# Patient Record
Sex: FEMALE | Race: WHITE
[De-identification: names, ages, dates, MRNs, and addresses within clinical notes are randomized per-mention and may not be internally consistent; named-entity substitution may affect disease eponyms.]

---

## 2020-08-27 ENCOUNTER — HOSPITAL ENCOUNTER (OUTPATIENT)
Dept: HOSPITAL 97 - ER | Age: 75
Setting detail: OBSERVATION
LOS: 1 days | Discharge: HOME | End: 2020-08-28
Payer: COMMERCIAL

## 2020-08-27 VITALS — BODY MASS INDEX: 20.5 KG/M2

## 2020-08-27 DIAGNOSIS — R11.2: ICD-10-CM

## 2020-08-27 DIAGNOSIS — I65.22: ICD-10-CM

## 2020-08-27 DIAGNOSIS — Z20.828: ICD-10-CM

## 2020-08-27 DIAGNOSIS — R00.1: ICD-10-CM

## 2020-08-27 DIAGNOSIS — E86.0: ICD-10-CM

## 2020-08-27 DIAGNOSIS — I67.2: ICD-10-CM

## 2020-08-27 DIAGNOSIS — H81.10: Primary | ICD-10-CM

## 2020-08-27 DIAGNOSIS — Q28.3: ICD-10-CM

## 2020-08-27 LAB
ALBUMIN SERPL BCP-MCNC: 3.9 G/DL (ref 3.4–5)
ALP SERPL-CCNC: 79 U/L (ref 45–117)
ALT SERPL W P-5'-P-CCNC: 19 U/L (ref 12–78)
AST SERPL W P-5'-P-CCNC: 16 U/L (ref 15–37)
BUN BLD-MCNC: 12 MG/DL (ref 7–18)
GLUCOSE SERPLBLD-MCNC: 115 MG/DL (ref 74–106)
HCT VFR BLD CALC: 47.2 % (ref 36–45)
LIPASE SERPL-CCNC: 127 U/L (ref 73–393)
LYMPHOCYTES # SPEC AUTO: 1.8 K/UL (ref 0.7–4.9)
PMV BLD: 9.3 FL (ref 7.6–11.3)
POTASSIUM SERPL-SCNC: 4.3 MMOL/L (ref 3.5–5.1)
RBC # BLD: 5.11 M/UL (ref 3.86–4.86)
UA COMPLETE W REFLEX CULTURE PNL UR: (no result)

## 2020-08-27 PROCEDURE — 83690 ASSAY OF LIPASE: CPT

## 2020-08-27 PROCEDURE — 81015 MICROSCOPIC EXAM OF URINE: CPT

## 2020-08-27 PROCEDURE — 70450 CT HEAD/BRAIN W/O DYE: CPT

## 2020-08-27 PROCEDURE — 87086 URINE CULTURE/COLONY COUNT: CPT

## 2020-08-27 PROCEDURE — 81003 URINALYSIS AUTO W/O SCOPE: CPT

## 2020-08-27 PROCEDURE — 82947 ASSAY GLUCOSE BLOOD QUANT: CPT

## 2020-08-27 PROCEDURE — 85025 COMPLETE CBC W/AUTO DIFF WBC: CPT

## 2020-08-27 PROCEDURE — 70553 MRI BRAIN STEM W/O & W/DYE: CPT

## 2020-08-27 PROCEDURE — 82550 ASSAY OF CK (CPK): CPT

## 2020-08-27 PROCEDURE — 93880 EXTRACRANIAL BILAT STUDY: CPT

## 2020-08-27 PROCEDURE — 93005 ELECTROCARDIOGRAM TRACING: CPT

## 2020-08-27 PROCEDURE — 96374 THER/PROPH/DIAG INJ IV PUSH: CPT

## 2020-08-27 PROCEDURE — 70544 MR ANGIOGRAPHY HEAD W/O DYE: CPT

## 2020-08-27 PROCEDURE — 84484 ASSAY OF TROPONIN QUANT: CPT

## 2020-08-27 PROCEDURE — 87088 URINE BACTERIA CULTURE: CPT

## 2020-08-27 PROCEDURE — 96361 HYDRATE IV INFUSION ADD-ON: CPT

## 2020-08-27 PROCEDURE — 36415 COLL VENOUS BLD VENIPUNCTURE: CPT

## 2020-08-27 PROCEDURE — 80076 HEPATIC FUNCTION PANEL: CPT

## 2020-08-27 PROCEDURE — 99285 EMERGENCY DEPT VISIT HI MDM: CPT

## 2020-08-27 PROCEDURE — 70549 MR ANGIOGRAPH NECK W/O&W/DYE: CPT

## 2020-08-27 PROCEDURE — 96375 TX/PRO/DX INJ NEW DRUG ADDON: CPT

## 2020-08-27 PROCEDURE — 80061 LIPID PANEL: CPT

## 2020-08-27 PROCEDURE — 82553 CREATINE MB FRACTION: CPT

## 2020-08-27 PROCEDURE — 80048 BASIC METABOLIC PNL TOTAL CA: CPT

## 2020-08-27 NOTE — EDPHYS
Physician Documentation                                                                           

 [facility]                                                                 

Name: Ermelinda Malhotra                                                                               

Age: 75 yrs                                                                                       

Sex: Female                                                                                       

: 1945                                                                                   

MRN: [id number]                                                                                   

Arrival Date: 2020                                                                          

Time: 16:38                                                                                       

Account#: [id number]                                                                            

Bed 15                                                                                            

Private MD:                                                                                       

ED Physician Prasanna Liao                                                                         

HPI:                                                                                              

                                                                                             

17:52 This 75 yrs old  Female presents to ER via Ambulatory with complaints of       snw 

      Nausea, Dizziness.                                                                          

17:52 The patient presents to the emergency department with nausea, that is moderate, that is snw 

      severe. Onset: The symptoms/episode began/occurred suddenly, this morning. The symptoms     

      are aggravated by movement. Associated signs and symptoms: Pertinent positives: nausea.     

      Severity of symptoms: At their worst the symptoms were moderate severe this morning.        

      The patient has not experienced similar symptoms in the past. It is unknown whether or      

      not the patient has recently seen a physician.                                              

                                                                                                  

Historical:                                                                                       

- Allergies:                                                                                      

16:47 Morphine;                                                                               sv  

- PMHx:                                                                                           

16:47 None;                                                                                   sv  

- PSHx:                                                                                           

16:47 Hysterectomy; Hemorroidectomy; Appendectomy;                                            sv  

                                                                                                  

- Immunization history:: Adult Immunizations up to date.                                          

- Social history:: Smoking status: .                                                              

                                                                                                  

                                                                                                  

ROS:                                                                                              

17:49 Constitutional: Negative for fever, chills, and weight loss, Eyes: Negative for injury, snw 

      pain, redness, and discharge, ENT: Negative for injury, pain, and discharge, Neck:          

      Negative for injury, pain, and swelling, Cardiovascular: Negative for chest pain,           

      palpitations, and edema, Respiratory: Negative for shortness of breath, cough,              

      wheezing, and pleuritic chest pain, Abdomen/GI: Negative for abdominal pain, vomiting,      

      diarrhea, and constipation, and positive for nausea Back: Negative for injury and pain,     

      : Negative for injury, bleeding, discharge, and swelling, MS/Extremity: Negative for      

      injury and deformity, Skin: Negative for injury, rash, and discoloration.                   

17:49 Neuro: Positive for dizziness.                                                              

                                                                                                  

Exam:                                                                                             

17:41 Constitutional:  This is a well developed, well nourished patient who is awake, alert,  snw 

      and in no acute distress. Head/Face:  Normocephalic, atraumatic. Eyes:  Pupils equal        

      round and reactive to light, extra-ocular motions intact.  Lids and lashes normal.          

      Conjunctiva and sclera are non-icteric and not injected.  Cornea within normal limits.      

      Periorbital areas with no swelling, redness, or edema. ENT:  Nares patent. No nasal         

      discharge, no septal abnormalities noted.  Tympanic membranes are normal and external       

      auditory canals are clear.  Oropharynx with no redness, swelling, or masses, exudates,      

      or evidence of obstruction, uvula midline.  Mucous membranes moist. Neck:  Trachea          

      midline, no thyromegaly or masses palpated, and no cervical lymphadenopathy.  Supple,       

      full range of motion without nuchal rigidity, or vertebral point tenderness.  No            

      Meningismus. Chest/axilla:  Normal chest wall appearance and motion.  Nontender with no     

      deformity.  No lesions are appreciated. Cardiovascular:  Regular rate and rhythm with a     

      normal S1 and S2.  No gallops, murmurs, or rubs.  Normal PMI, no JVD.  No pulse             

      deficits. Respiratory:  Lungs have equal breath sounds bilaterally, clear to                

      auscultation and percussion.  No rales, rhonchi or wheezes noted.  No increased work of     

      breathing, no retractions or nasal flaring. Abdomen/GI:  Soft, non-tender, with normal      

      bowel sounds.  No distension or tympany.  No guarding or rebound.  No evidence of           

      tenderness throughout. Back:  No spinal tenderness.  No costovertebral tenderness.          

      Full range of motion. Skin:  Warm, dry with normal turgor.  Normal color with no            

      rashes, no lesions, and no evidence of cellulitis. MS/ Extremity:  Pulses equal, no         

      cyanosis.  Neurovascular intact.  Full, normal range of motion. Neuro:  Awake and           

      alert, GCS 15, oriented to person, place, time, and situation.  Cranial nerves II-XII       

      grossly intact.  Motor strength 5/5 in all extremities.  Sensory grossly intact.            

      Cerebellar exam normal.  minimal nystagmus bilaterally with head turns Psych:  Awake,       

      alert, with orientation to person, place and time.  Behavior, mood, and affect are          

      within normal limits.                                                                       

                                                                                                  

Vital Signs:                                                                                      

16:47  / 57; Pulse 76; Resp 16; Temp 97.8; Pulse Ox 100% ; Weight 58.97 kg; Height 5    sv  

      ft. 7 in. (170.18 cm);                                                                      

18:18  / 63; Pulse 55; Resp 17; Pulse Ox 100% on R/A;                                   tw2 

19:30  / 073; Pulse 61; Resp 16; Pulse Ox 100% on R/A;                                  jb4 

20:30  / 55; Pulse 60; Resp 16; Pulse Ox 100% on R/A;                                   jb4 

21:30  / 49; Pulse 73; Resp 16; Pulse Ox 100% on R/A;                                   jb4 

22:30 BP 99 / 45; Pulse 62; Resp 16; Pulse Ox 98% on R/A;                                     jb4 

23:15  / 57; Pulse 52; Resp 15; Pulse Ox 98% on R/A;                                    jb4 

16:47 Body Mass Index 20.36 (58.97 kg, 170.18 cm)                                             sv  

                                                                                                  

MDM:                                                                                              

17:10 Patient medically screened.                                                             snw 

19:33 Data reviewed: vital signs, nurses notes. Data interpreted: Pulse oximetry: on room air snw 

      is 100 %. Interpretation: normal. Counseling: I had a detailed discussion with the          

      patient and/or guardian regarding: the historical points, exam findings, and any            

      diagnostic results supporting the discharge/admit diagnosis, lab results, radiology         

      results. Response to treatment: the patient's symptoms have mildly improved after           

      treatment, continued dehydration, will give another bolus and then re-evaluate. If pt       

      remains dizzy post 2nd bolus, will admit for MRI tomorrow. Pt voices understanding of       

      plan of care..                                                                              

20:17 Physician consultation: Logan Jaramillo MD was called at 20:17, regarding consult.      snw 

21:30 Physician consultation: Jose Eduardo LY was called at 21:30, was contacted at       snw 

      21:30, regarding admission, to the telemetry unit.                                          

                                                                                                  

                                                                                             

17:09 Order name: Basic Metabolic Panel; Complete Time: 17:41                                 snw 

                                                                                             

17:09 Order name: CBC with Diff; Complete Time: 17:37                                         snw 

                                                                                             

20:11 Interpretation: RBC 5.11.                                                               snw 

                                                                                             

17:09 Order name: Hepatic Function; Complete Time: 17:41                                      snw 

                                                                                             

17:09 Order name: Lipase; Complete Time: 17:41                                                snw 

                                                                                             

17:09 Order name: Urine Culture                                                               snw 

                                                                                             

17:09 Order name: Urine Microscopic Only; Complete Time: 19:20                                snw 

                                                                                             

17:09 Order name: CT Head Brain wo Cont; Complete Time: 17:56                                 snw 

                                                                                             

19:19 Order name: Urine Dipstick--Ancillary (enter results); Complete Time: 19:30             mw2 

                                                                                             

20:12 Order name: Troponin (emerg Dept Use Only)                                              snw 

                                                                                             

20:13 Order name: Troponin (Emerg Dept Use Only); Complete Time: 21:23                        EDMS

                                                                                             

17:09 Order name: IV Saline Lock; Complete Time: 17:24                                        snw 

                                                                                             

17:09 Order name: Labs collected and sent; Complete Time: 17:25                               snw 

                                                                                             

17:09 Order name: Urine Dipstick-Ancillary (obtain specimen); Complete Time: 19:15            snw 

                                                                                             

20:12 Order name: EKG; Complete Time: 20:13                                                   snw 

                                                                                             

20:12 Order name: EKG - Nurse/Tech; Complete Time: 21:21                                      snw 

                                                                                                  

EC:30 Rate is 210 beats/min. Rhythm is regular. QRS Axis is Normal. AK interval is normal.    snw 

      QRS interval is normal. QT interval is normal. No Q waves. Clinical impression: Sinus       

      bradycardia.                                                                                

                                                                                                  

Administered Medications:                                                                         

17:30 Drug: Antivert 50 mg Route: PO;                                                         tw2 

18:00 Follow up: Response: No adverse reaction                                                tw2 

17:30 Drug: Phenergan 25 mg Route: PO;                                                        tw2 

19:00 Follow up: Response: No adverse reaction; No change in condition                        tw2 

18:05 Drug: NS 0.9% 1000 ml Route: IV; Rate: 1 bolus; Site: right antecubital;                tw2 

19:20 Follow up: Response: No adverse reaction; IV Status: Completed infusion; IV Intake:     jb4 

      1000ml                                                                                      

18:05 Drug: Valium 2 mg Route: IVP; Site: right antecubital;                                  tw2 

19:00 Follow up: Response: No adverse reaction; No adverse reaction, feeling "some better",   tw2 

      still c/o dizziness                                                                         

19:50 Drug: NS 0.9% 1000 ml Route: IV; Rate: 1 bolus; Site: right antecubital;                jb4 

20:50 Follow up: Response: No adverse reaction; IV Status: Completed infusion; IV Intake:     jb4 

      1000ml                                                                                      

19:50 Drug: Antivert 25 mg Route: PO;                                                         jb4 

21:00 Follow up: Response: No adverse reaction; No change in condition                        jb4 

19:51 Drug: Phenergan 6.25 mg Route: IVP; Site: right antecubital;                            jb4 

20:30 Follow up: Response: No adverse reaction; Nausea is decreased; Vomiting decreased       jb4 

21:00 Drug: Aspirin 81 mg Route: PO;                                                          jb4 

21:30 Follow up: Response: No adverse reaction                                                jb4 

21:00 Drug: foLIC Acid 1 mg Route: IVPB; Site: right antecubital;                             jb4 

21:05 Follow up: Response: No adverse reaction; IV Status: Completed infusion                 jb4 

                                                                                                  

                                                                                                  

Disposition:                                                                                      

20 21:29 Hospitalization ordered by Jesse Liao for Observation. Preliminary             

  diagnosis are Dizziness and giddiness, Dehydration.                                             

- Bed requested for Telemetry/MedSurg (observation).                                              

- Status is Observation.                                                                      iw  

- Condition is Stable.                                                                            

- Problem is new.                                                                                 

- Symptoms are unchanged.                                                                         

                                                                                                  

                                                                                                  

                                                                                                  

Addendum:                                                                                         

2020                                                                                        

     07:12 Co-signature as Attending Physician, Prasanna Liao MD.                                    r
n

                                                                                                  

Signatures:                                                                                       

Dispatcher [employer]                           EDMichelle Dacosta RN RN                                                      

Heavenly Cano RN                        Arlene Harper, FNP-C                   FNP-Csnw                                                  

Letha Navarrete RN RN                                                      

Prasanna Liao MD MD rn Wise, Tara, RN RN   tw2                                                  

Tj Heard RN                       RN   jb4                                                  

                                                                                                  

Corrections: (The following items were deleted from the chart)                                    

                                                                                             

21: 21:29 Hospitalization Ordered by Jose Eduardo LY for Observation. Preliminary       snw 

      diagnosis is Dizziness and giddiness; Dehydration. Bed requested for Telemetry/MedSurg      

      (observation). Status is Observation. Condition is Stable. Problem is new. Symptoms are     

      unchanged. snw                                                                              

23: 21:29 2020 21:29 Hospitalization Ordered by Jesse Liao MD for Observation.       

      Preliminary diagnosis is Dizziness and giddiness; Dehydration. Bed requested for            

      Telemetry/MedSurg (observation). Status is Observation. Condition is Stable. Problem is     

      new. Symptoms are unchanged. snw                                                            

                                                                                             

00:25  23:03 2020 21:29 Hospitalization Ordered by Jesse Liao MD for            iw  

      Observation. Preliminary diagnosis is Dizziness and giddiness; Dehydration. Bed             

      requested for Telemetry/MedSurg (observation). Status is Observation. Condition is          

      Stable. Problem is new. Symptoms are unchanged.                                           

                                                                                                  

**************************************************************************************************

## 2020-08-27 NOTE — XMS REPORT
Continuity of Care Document

                           Created on:2020



Patient:ANTHONY THAKKAR

Sex:Female

:1945

External Reference #:[id number]





Demographics







                          Address                   [address]



                                                    [place] [address]

 

                          Home Phone                [phone]

 

                          Mobile Phone              [phone]

 

                          Email Address             [email or web address]

 

                          Preferred Language        English

 

                          Marital Status            Single

 

                          Gnosticism Affiliation     [id number]

 

                          Race                      Unknown

 

                          Additional Race(s)        Unavailable



                                                    White

 

                          Ethnic Group              Not  or 









Author







                          Organization              [facility]

t

 

                          Address                   [address]



                                                    [place] [address]

 

                          Phone                     [phone]









Support







                Name            Relationship    Address         Phone

 

                Prudence           Other           Unavailable     [phone]









Care Team Providers







                    Name                Role                Phone

 

                    Jc Albarran MD    Primary Care Physician [phone]

 

                    Jc Albarran MD    Attending Clinician [phone]









Payers







           Payer Name Policy Type Policy Number Effective Date Expiration Source



                                                       Date       

 

           HUMANA                xxxxxxxxx  2018            [place]



           MEDICAREHUMANA                       00:00:00              [employer]



           MEDICARE                                               



           PPO/PFFS/ERS                                             



           [id number]                                             



           8-PresentPPO                                             







Problems







       Condition Condition Condition Status Onset  Resolution Last   Treating Co

mments 

Source



       Name   Details Category        Date   Date   Treatment Clinician        



                                                 Date                 

 

       Constipati Constipati Disease Active                              [facility]

[place]



       on     on                                                  [facility]



                                   00:00:                             st



                                   00                                 

 

       History of History of Disease Active                              [facility]

[place]



       malignant malignant                                              [facility]

[facility]



       neoplasm neoplasm               00:00:                             st



       of breast of breast               00                                 

 

       Osteoporos Osteoporos Disease Active                              [facility]

[place]



       is     is                                                  [facility]



                                   00:00:                             st



                                   00                                 







Allergies, Adverse Reactions, Alerts







       Allergy Allergy Status Severity Reaction(s) Onset  Inactive Treating Comm

ents 

Source



       Name   Type                        Date   Date   Clinician        

 

       Morphine Propensi Active                                           [facility]

n



              ty to                                                   Methodi



              adverse                                                  st



              reaction                                                  



              s to                                                    



              drug                                                    







Family History







           Family Member Diagnosis  Comments   Start Date Stop Date  Source

 

           Natural sister Cancer                                      [facility]







Social History







           Social Habit Start Date Stop Date  Quantity   Comments   Source

 

           Sex Assigned At                                             [facility]

ethodist



           Birth                                                  

 

           Exposure to                       Not sure              [place] [facility]

dist



           SARS-CoV-2                                             



           (event)                                                

 

           Alcohol intake 2020 Current               [facility]



                      00:00:00   00:00:00   non-drinker of            



                                            alcohol               



                                            (finding)             









                Smoking Status  Start Date      Stop Date       Source

 

                Current every day smoker 2020 00:00:00                 [facility]

[facility]







Medications







       Ordered Filled Start  Stop   Current Ordering Indication Dosage Frequency

 Signature

                    Comments            Components          Source



     Medication Medication Date Date Medication? Clinician                (SIG) 

          



     Name Name                                                   

 

     LYSINE ORAL            Yes                      Take by           [facility]

ston



               8-25                               mouth.           Methodi



               09:00:                                              st



               06                                                

 

     rosuvastati      2019 2020- No             10mg QD   TAKE 1           [facility]

ston



     n (CRESTOR)      1-17 11-16                          TABLET (10           M

ethodi



     10 MG      00:00: 23:59                          MG TOTAL)           st



     tablet      00   :00                           BY MOUTH           



                                                  NIGHTLY.           



                                                  FOR            



                                                  CHOLESTERO           



                                                  L              

 

     cholecalcif       No             65389S Q7D  Take 1           [facility]

[place]



     raimundo,      8-25                          capsule           Methodi



     vitamin D3,      00:00: 00:00                          (50,000           st



     50,000 unit      00   :00                           Units           



     capsule                                         total) by           



                                                  mouth once           



                                                  a week.           

 

     rosuvastati       No             10mg QD   Take 1           [facility]

ston



     n (CRESTOR)      8- 11-16                          tablet (10           M

ethodi



     10 MG      00:00: 00:00                          mg total)           st



     tablet      00   :00                           by mouth           



                                                  nightly.           



                                                  For            



                                                  cholestero           



                                                  l              

 

     DENAVIR 1 %            Yes                      APPLY           [place]

on



     cream      -                               THREE (3)           Methodi



               00:00:                               TIMES           st



               00                                 DAILY FOR           



                                                  FOUR (4)           



                                                  DAYS.           

 

     valACYclovi            Yes                      1 DAY DOSE           

Richard



     r (VALTREX)      6-28                               AT ONSET           [facility]

altagracia



     500 MG      00:00:                               OF             st



     tablet      00                                 SYMPTOMS:           



                                                  TAKE 4           



                                                  TABLETS           



                                                  IMMEDIATEL           



                                                  Y TYHEN 4           



                                                  TABLETS 12           



                                                  HOURS           



                                                  LATER.           







Immunizations







           Ordered Immunization Filled Immunization Date       Status     Commen

ts   Source



           Name       Name                                        

 

           Pneumococcal            2019 Completed             [place]



           Conjugate 13-Valent            00:00:00                         [facility]

dist

 

           Influenza,            2016 Completed             [place]



           Unspecified            00:00:00                         [facility]

 

           Pneumococcal            2014 Completed             [facility]



           Polysaccharide            00:00:00                         [facility]







Vital Signs







             Vital Name   Observation Time Observation Value Comments     Source

 

             Systolic blood 2020 08:58:00 118 mm[Hg]                [place]

n [facility]



             pressure                                            

 

             Diastolic blood 2020 08:58:00 74 mm[Hg]                 [place]

on [facility]



             pressure                                            

 

             Heart rate   2020 08:58:00 70 /min                   [facility]

 

             Body temperature 2020 08:58:00 36.67 Starr                 [facility]

n[facility]

 

             Respiratory rate 2020 08:58:00 18 /min                   [facility]

nata [facility]

 

             Body height  2020 08:58:00 170.2 cm                  [facility] Manuel [facility]oit

 

             Body weight  2020 08:58:00 58.514 kg                 [facility]

 

             BMI          2020 08:58:00 20.20 kg/m2               [facility]

 

             Oxygen saturation in 2020 08:58:00 100 /min                  

Jose Manuel Benoit



             Arterial blood by                                        



             Pulse oximetry                                        







Procedures







                Procedure       Date / Time Performed Performing Clinician Sourc

e

 

                URINE CULTURE   2020 10:05:00 Miranda Albarran

 

                CBC WITH PLATELET AND 2020 10:05:00 Miranda Albarran



                DIFFERENTIAL                                    

 

                COMPREHENSIVE METABOLIC 2020 10:05:00 Miranda Albarran



                PANEL                                           

 

                HEMOGLOBIN A1C  2020 10:05:00 Miranda Albarran

 

                LIPID PANEL     2020 10:05:00 Miranda Albarran

 

                URINALYSIS, COMPLETE, 2020 10:05:00 Miranda Albarran



                WITH REFLEX TO CULTURE                                 

 

                THYROID STIMULATING 2020 10:05:00 Miranda Albarran



                HORMONE                                         

 

                VITAMIN B12 LEVEL 2020 10:05:00 Miranda Albarran

 

                VITAMIN D 25 HYDROXY 2020 10:05:00 Miranda Albarran



                LEVEL                                           

 

                CT CARDIAC CALCIUM SCORE 2019 12:40:00 Miranda Albarran

 

                MAMMO BREAST SCREEN 2019 12:05:28 Miranda Albarran



                TOMOSYNTHESIS BILATERAL                                 







Plan of Care







             Planned Activity Planned Date Details      Comments     Source

 

             Future Scheduled 2027   COLONOSCOPY SCREENING              Inderjit Benoit



             Test         00:00:00     [code = COLONOSCOPY              



                                       SCREENING]                

 

             Future Scheduled 2020-10-01   INFLUENZA VACCINE              [facility]

n [facility]



             Test         00:00:00     [code = INFLUENZA              



                                       VACCINE]                  

 

             Future Scheduled 1994   SHINGLES VACCINES              [place]

n [facility]



             Test         00:00:00     (#1) [code = SHINGLES              



                                       VACCINES (#1)]              







Encounters







        Start   End     Encounter Admission Attending Care    Care    Encounter 

Source



        Date/Time Date/Time Type    Type    Clinicians Facility Department ID   

   

 

        2020 Outpatient         [facility],   [facility]     [id number]

[address] [place]



        00:00:00 00:00:00                 MIRANDA                 558     Method

i



                                                                        st







Results







           Test Description Test Time  Test Comments Results    Result Comments 

Source









                    Comprehensive metabolic panel 2020 09:33:00 









                      Test Item  Value      Reference Range Interpretation Comme

nts









             Glucose (test code = 89 mg/dL     65-99                            

     Fasting



             2345-7)                                             reference inter

carla

 

             BUN (test code = 9 mg/dL                            



             3094-0)                                             

 

             Creatinine (test code 0.80 mg/dL   0.6-0.93                  For pa

tients >49 years of



             = 2160-0)                                           age, the refere

nce



                                                                 limitfor Creati

nine is



                                                                 approximately 1

3% higher



                                                                 for peopleident

ified as



                                                                 -Hilary

n.

 

             EGFR Non-Afr. 72           > OR = 60                 



             American (test code =              mL/min/1.73m2              



             2775)                                               

 

             EGFR  83           > OR = 60                 



             (test code = 05438-7)              mL/min/1.73m2              

 

             BUN/creatinine ratio NOT APPLICABLE 6- 22 (calc)              



             (test code = 3097-3)                                        

 

             Sodium (test code = 139 mmol/L   135-146                   



             2951-2)                                             

 

             Potassium (test code 4.2 mmol/L   3.5-5.3                   



             = 2823-3)                                           

 

             Chloride (test code = 102 mmol/L                       



             5-0)                                             

 

             CO2 (test code = 30 mmol/L    20-32                     



             -9)                                             

 

             Calcium (test code = 9.3 mg/dL    8.6-10.4                  



             15229-9)                                            

 

             Protein (test code = 7.2 g/dL     6.1-8.1                   



             2885-2)                                             

 

             Albumin, S (test code 4.4 g/dL     3.6-5.1                   



             = 1751-7)                                           

 

             Globulin, total (test 2.8          1.9- 3.7 g/dL              



             code = 83706-6)              (calc)                    

 

             Albumin/globulin 1.6          1.0- 2.5 (calc)              



             ratio (test code =                                        



             1759-0)                                             

 

             Total bilirubin (test 0.4 mg/dL    0.2-1.2                   



             code = -2)                                        

 

             Alkaline phosphatase 69 U/L                           



             (test code = 6768-6)                                        

 

             AST (test code = 16 U/L       10-35                     



             1920-8)                                             

 

             ALT (test code = 12 U/L       6-29                      



             1742-6)                                             

 

             ULISSES (test code = ULISSES) FASTING:YESFASTING:                          

 



                          YES                                    

 

             RAC (test code = RAC) Performing                             



                          Organization                           



                          Information:    Site                           



                          ID: [facility]    [facility]                           



                          Lab    Address: [address] [place] [address]                           



                             Director: Gerard Stacy                           



[place] [facility] [id number] 09:33:00





             Test Item    Value        Reference Range Interpretation Comments

 

             Cholesterol, total 245 mg/dL    <200         H            



             (test code = 2093-3)                                        

 

             HDL cholesterol 53 mg/dL     > OR = 50                 



             (test code = 2085-9)                                        

 

             Triglycerides (test 169 mg/dL    <150         H            



             code = 2571-8)                                        

 

             LDL cholesterol 160          mg/dL (calc) H            Reference ra

nge:



             calculated (test                                        <100 Desira

ble



             code = 72205-7)                                        range <100 m

g/dL



                                                                 for primary



                                                                 prevention;  <7

0



                                                                 mg/dL for



                                                                 patients with C

HD



                                                                 or diabetic



                                                                 patients with >



                                                                 or = 2 CHD risk



                                                                 factors. LDL-C 

is



                                                                 now calculated



                                                                 using the



                                                                 Alex-Elis



                                                                 calculation,



                                                                 which is a



                                                                 validated novel



                                                                 method providin

g



                                                                 better accuracy



                                                                 than the



                                                                 Friedewald



                                                                 equation in the



                                                                 estimation of



                                                                 LDL-C. Alex S

S



                                                                 et al. DUANE.



                                                                 2013;310(19):



                                                                 8196-7672



                                                                 (http://educati

on



                                                                 .DelfmemsDiagnosti

ProcessUnity



                                                                 .com/faq/LRR100

)

 

             Cholesterol/HDL 4.6          <5.0 (calc)               



             ratio (test code =                                        



             9830-1)                                             

 

             Non-HDL cholesterol 192          <130 mg/dL   H            For ashley

ents with



             (test code =              (calc)                    diabetes plus 1



             86644-5)                                            major ASCVD ris

k



                                                                 factor, treatin

g



                                                                 to a non-HDL-C



                                                                 goal of <100



                                                                 mg/dL (LDL-C of



                                                                 <70 mg/dL) is



                                                                 considered a



                                                                 therapeutic



                                                                 option.

 

             ULISSES (test code = FASTING:YESFASTING                           



             ULISSES)         : YES                                  

 

             RAC (test code = Performing                             



             RAC)         Organization                           



                          Information:                           



                          Site ID: [facility]                           



                          Name: [facility]    Address:                           



                          [address], [place]                           



                          [address]                             



                          Director: Gerard Stacy                           

 

             Lab Interpretation Abnormal                               



             (test code =                                        



             75348-7)                                            



[place] [facility] [id number] 09:33:00





             Test Item    Value        Reference    Interpretation Comments



                                       Range                     

 

             Vitamin B12  264 pg/mL    200-1100                   Please Note: A

lthough the



             (test code =                                        reference range

 for



             2132-9)                                             [id number] is 2





                                                                 pg/mL, it has b

een reported



                                                                 that between5 a

nd 10% of



                                                                 patients with v

alues between



                                                                 200 and 400pg/m

L may



                                                                 experience neur

opsychiatric



                                                                 and hematologic

abnormalities



                                                                 due to occult B

12



                                                                 deficiency; les

s than 1%of



                                                                 patients with v

alues above



                                                                 400 pg/mL will 

have



                                                                 symptoms.

 

             ULISSES (test    FASTING:YESFASTIN                           



             code = ULISSES)  G: YES                                 

 

             RAC (test    Performing                             



             code = RAC)  Organization                           



                          Information:                           



                          Site ID: RGA                           



                          Name: [facility]                           



                          on Lab                                 



                          Address: [address] [place] [address]                             



                          Director: Gerard Stacy                           



[facility] [id number] 09:33:00





             Test Item    Value        Reference    Interpretation Comments



                                       Range                     

 

             Hemoglobin A1C 5.7          <5.7 % of    H            For someone w

ithout



             (test code =              total Hgb                 known diabetes,

 a



             4548-4)                                             hemoglobin A1c 

value



                                                                 between 5.7% an

d



                                                                 6.4% is consist

ent



                                                                 withprediabetes

 and



                                                                 should be confi

rmed



                                                                 with a follow-u

p



                                                                 test. For someo

ne



                                                                 with known diab

etes,



                                                                 a value



                                                                 &lt;7%indicates

 that



                                                                 their diabetes 

is



                                                                 well controlled

.



                                                                 J7jmnvetdl shou

ld be



                                                                 individualized 

based



                                                                 on duration



                                                                 ofdiabetes, age

,



                                                                 comorbid condit

ions,



                                                                 and



                                                                 otherconsiderat

ions.



                                                                 This assay resu

lt is



                                                                 consistent with

 an



                                                                 increased risko

f



                                                                 diabetes. Curre

ntly,



                                                                 no consensus ex

ists



                                                                 regarding use



                                                                 ofhemoglobin A1

c for



                                                                 diagnosis of



                                                                 diabetes for



                                                                 children.

 

             ULISSES (test code = FASTING:YESFASTIN                           



             ULISSES)         G: YES                                 

 

             RAC (test code = Performing                             



             RAC)         Organization                           



                          Information:                           



                          Site ID: RGA                           



                          Name: [facility]                           



                          on Lab                                 



                          Address: [address] [place], [place] [id number]                             



                          Director: Gerard Stacy                           

 

             Lab Interpretation Abnormal                               



             (test code =                                        



             40894-5)                                            



[facility] stimulating [id number] 09:33:00





             Test Item    Value        Reference Range Interpretation Comments

 

             TSH (test code = 2.75         0.40- 4.50 mIU/L              



             3016-3)                                             

 

             ULISSES (test code = FASTING:YESFASTING: YES                           



             ULISSES)                                                

 

             RAC (test code = Performing Organization                           



             RAC)         Information:    Site ID:                           



                          RGA    Name: [facility]                           



                          Address: [address] [place], [place] [id number]    Director:                           



                          Gerard BenoitUrine [id number] 09:33:00





             Test Item    Value        Reference Range Interpretation Comments

 

             Urine culture SEE NOTE                                 CULTURE, URI

NE,



             (test code =                                        ROUTINE     Bill

ro



             630-4)                                              Number:



                                                                 [id number]  Test



                                                                 Status:



                                                                 Final  Specimen



                                                                 Source:   URINE



                                                                 Specimen Qualit

y:



                                                                  Adequate



                                                                 Result:



                                                                  No Growth

 

             ULISSES (test code = FASTING:YESFASTING:                           



             ULISSES)         YES                                    

 

             RAC (test code = Performing                             



             RAC)         Organization                           



                          Information:    Site                           



                          ID: [facility]    Name:                           



                          [facility]                           



                          Lab    Address: [address] [place] [address]                             



                          Director: eGrard Stacy                           



[facility] with platelet and [id number] 09:33:00





             Test Item    Value        Reference Range Interpretation Comments

 

             WBC (test code = 7.3          3.8- 10.8                 



             6690-2)                   Thousand/uL               

 

             RBC (test code = 789-8) 4.86         3.80- 5.10                



                                       Million/uL                

 

             HGB (test code = 718-7) 14.9 g/dL    11.7-15.5                 

 

             HCT (test code = 45.4 %       35-45        H            



             4544-3)                                             

 

             MCV (test code = 787-2) 93.4 fL                          

 

             MCH (test code = 785-6) 30.7 pg      27-33                     

 

             MCHC (test code = 32.8 g/dL    32-36                     



             786-4)                                              

 

             RDW (test code = 788-0) 12.8 %       11-15                     

 

             Platelet count (test 219          140- 400                  



             code = 777-3)              Thousand/uL               

 

             MPV (test code = 776-5) 10.6 fL      7.5-12.5                  

 

             Neutrophils, absolute 4358         1,500 - 7,800              



             (test code = 751-8)              cells/uL                  

 

             Lymphocytes, absolute 2380         850- 3,900                



             (test code = 731-0)              cells/uL                  

 

             Monocytes, absolute 431          200- 950 cells/uL              



             (test code = 742-7)                                        

 

             Eosinophils, absolute 80           15- 500 cells/uL              



             (test code = 711-2)                                        

 

             Basophils, absolute 51           0- 200 cells/uL              



             (test code = 704-7)                                        

 

             Neutrophils (test code 59.7 %                                 



             = 770-8)                                            

 

             Lymphocytes (test code 32.6 %                                 



             = 736-9)                                            

 

             Monocytes (test code = 5.9 %                                  



             5905-5)                                             

 

             Eosinophils (test code 1.1 %                                  



             = 713-8)                                            

 

             Basophils + RC (test 0.7 %                                  



             code = 706-2)                                        

 

             ULISSES (test code = ULISSES) FASTING:YESFASTING:                          

 



                          YES                                    

 

             RAC (test code = RAC) Performing                             



                          Organization                           



                          Information:    Site                           



                          ID: A    Name:                           



                          [facility]                           



                          Lab    Address: [address] [place] [address]                            



                          [id number]                             



                          Director: Gerard Stacy                           

 

             Lab Interpretation Abnormal                               



             (test code = 29903-3)                                        



[facility] Methodist[facility]itamin D 25 hydroxy [id number] 09:33:00





             Test Item    Value        Reference Range Interpretation Comments

 

             Vitamin D,   24 ng/mL            L            Vitamin D Statu

s



             25-hydroxy (test                                               25-O

H



             code = 1989-3)                                        Vitamin D:



                                                                 Deficiency:



                                                                              <2

0



                                                                 ng/mLInsufficie

nc



                                                                 y:             

20



                                                                 - 29



                                                                 ng/mLOptimal:



                                                                             > o

r



                                                                 = 30 ng/mL For



                                                                 25-OH Vitamin D



                                                                 testing on



                                                                 patients on



                                                                 D2-supplementat

io



                                                                 n and patients



                                                                 for whom



                                                                 quantitation of



                                                                 D2 and D3



                                                                 fractions is



                                                                 required, the



                                                                 Quest[employer]ureD(TM

)2



                                                                 5-OH VIT D,



                                                                 (D2,D3), LC/MS/

MS



                                                                 is recommended:



                                                                 order code 9288

8



                                                                 (patients



                                                                 >2yrs).See Note

 1



                                                                 Note 1 For



                                                                 additional



                                                                 information,



                                                                 please refer to



                                                                 http://educatio

n.



                                                                 [employer]Publisher.



                                                                 com/faq/ZFO467



                                                                 (This link is



                                                                 being provided



                                                                 for



                                                                 informational/e

du



                                                                 cational purpos

es



                                                                 only.)

 

             ULISSES (test code = FASTING:YESFASTING                           



             ULISSES)         : YES                                  

 

             RAC (test code = Performing                             



             RAC)         Organization                           



                          Information:                           



                          Site ID: A                           



                          [facility]    Address:                           



                          [address] [place] [address]                           



                          [phone]                             



                          Director: Gerard Stacy                           

 

             Lab Interpretation Abnormal                               



             (test code =                                        



             32001-0)                                            



[place] [facility]INALYSIS, COMPLETE, WITH REFLEX TO [id number] 09:33:00





             Test Item    Value        Reference Range Interpretation Comments

 

             Color, UA (test code = YELLOW       YELLOW                    



             5778-6)                                             

 

             Appearance (test code = CLEAR        CLEAR                     



             5767-9)                                             

 

             Specific gravity, urine 1.007        1.001-1.035               



             (test code = 5811-5)                                        

 

             pH, urine (test code = 5.5          5.0-8.0                   



             5803-2)                                             

 

             Glucose, urine (test NEGATIVE     NEGATIVE                  



             code = 06582-6)                                        

 

             Bilirubin, UA (test code NEGATIVE     NEGATIVE                  



             = 5770-3)                                           

 

             Ketones, UA (test code = NEGATIVE     NEGATIVE                  



             2514-8)                                             

 

             Occult blood, urine TRACE        NEGATIVE     A            



             (test code = 5794-3)                                        

 

             Protein, UA (test code = NEGATIVE     NEGATIVE                  



             06750-4)                                            

 

             Nitrite, UA (test code = NEGATIVE     NEGATIVE                  



             5802-4)                                             

 

             Leukocyte esterase, UA TRACE        NEGATIVE     A            



             (test code = 5799-2)                                        

 

             WBC, UA (test code = NONE SEEN    < OR = 5 /HPF              



             5821-4)                                             

 

             RBC, UA (test code = NONE SEEN    < OR = 2 /HPF              



             20930-7)                                            

 

             Squamous epithelial NONE SEEN    < OR = 5 /HPF              



             cells, UA (test code =                                        



             22229-2)                                            

 

             Bacteria, UA (test code NONE SEEN    NONE SEEN /HPF              



             = 5769-5)                                           

 

             Hyaline casts, UA (test NONE SEEN    NONE SEEN /LPF              



             code = 5796-8)                                        

 

             Reflex (test code = CULTURE INDICATED -                           



             630-4)       RESULTS TO FOLLOW                           

 

             ULISSES (test code = ULISSES) FASTING:YESFASTING:                          

 



                          YES                                    

 

             RAC (test code = RAC) Performing                             



                          Organization                           



                          Information:    Site                           



                          ID: RGA    Name:                           



                          [facility]                           



                          Lab    Address: [address] [place] [address]                            



                          [phone]                             



                          Director: Gerard Stacy                           

 

             Lab Interpretation (test Abnormal                               



             code = 15308-2)                                        



[facility] [id number] 13:30:51Hm Interface, 
Radiology Results 2019  1:34 PM CDTEXAMINATION:  CT CARDIAC 
CALCIUM SCORECLINICAL HISTORY:  E78.2 Mixed hyperlipidemia, CAD risk  
intermediate  asymptomatic, mixed HLD  elev apo B - please eval 
coronariesCOMPARISON:  Chest CT from 2018.IMPRESSION:Sequential 2.5 mm 
CT cuts were obtained through the chest using Smart[employer] VCT 64 slice CT 
scanner with ECG gating. Interactive image viewing and volumetric display and 
analysis were also performed. The CAC score was quantified using the Agatston 
scoring method.Agatston total coronary artery calcium score: 0Left Main (LM): 
0Left Anterior Descending (LAD): 0Left Circumflex (LCx): 0Right Coronary Artery 
(RCA): 0Posterior Descending Artery (PDA): 0Agatston Calcium Score (total) 
Extent of Atherosclerosis0-Normal 1-10 - Minimal extent of epxrqqfdrorfzpm34-336
- Mild extent of yafjolkrxgdcdtr252-829 - Moderate extentof atherosclerosis&gt; 
400 - Severe extent of atherosclerosisRECOMMENDATION:A score of 0 places the p
atient in the 0th percentile rank. That means 100% of the females at the ages 
from 71-75 have a higher calcium score. Continue primary preventative strategies
(score zero).INCIDENTAL FINDINGS:1.  Aortic calcifications.2.  Stable 3-4 mm 
solid nodule in the right middle lobe, 3 mm groundglass nodule in the lingula 
and areas of mucous plugging.3.  Trace pericardial effusion.[id number]
[facility] [id number] 12:54:33
PROCEDURE: MAMMO BREAST SCREEN TOMOSYNTHESIS BILATERAL Computer aided detection 
was utilized for theinterpretation of the digital bilateral screening 
mammography with tomosynthesis. COMPARISON: None available. INDICATION: 
74-year-old female with a history of right breast malignancy with lumpectomy and
radiation in . She presents for routine screening. FINDINGS: The breast are 
heterogenously dense, which may obscure small masses.There is a postsurgical 
scar with associated dystrophic calcifications in the right superior posterior 
depth. No significant mass, asymmetry or architectural distortionis identified 
in either breast. IMPRESSION:  No mammographic evidence of malignancy. 
RECOMMENDATION:Annual screening mammogram. BI-RADS 2: BENIGN  This facility is 
accredited by The [employer] for Mammography.  A negative x-
ray report should not delay biopsy if a dominant or clinically suspicious mass 
is present. Not all cancers are identified by x-ray.  [id number] Manuel Benoit

## 2020-08-27 NOTE — RAD REPORT
EXAM DESCRIPTION:  CT - Head Brain Wo Cont - 8/27/2020 5:40 pm

 

CLINICAL HISTORY:  DIZZINESS

Headache, drowsiness, dizziness and nausea

 

COMPARISON:  No comparisons

 

TECHNIQUE:  All CT scans are performed using dose optimization technique as appropriate and may inclu
de automated exposure control or mA/KV adjustment according to patient size.

 

FINDINGS:  No intracranial hemorrhage, hydrocephalus or extra-axial fluid collection.Mild generalized
 brain atrophy is noted.No areas of brain edema or evidence of midline shift.

 

The paranasal sinuses and mastoids are clear. The calvarium is intact.

 

IMPRESSION:  No acute intracranial abnormality.

## 2020-08-27 NOTE — XMS REPORT
Clinical Summary

                           Created on:2020



Patient:Ermelinda Malhotra

Sex:Female

:1945

External Reference #:[id number]





Demographics







                          Address                   [address] 



                                                    [place] [address]

 

                          Mobile Phone              [phone]

 

                          Home Phone                [phone]

 

                          Email Address             [email or web address]

 

                          Preferred Language        English

 

                          Marital Status            Single

 

                          Religion Affiliation     Unknown

 

                          Race                      White

 

                          Ethnic Group              Not  or 









Author







                          Organization              [facility]

 

                          Address                   [address] [place] [address]









Support







                Name            Relationship    Address         Phone

 

                Carter Foss    Unavailable     Unavailable     [phone]









Care Team Providers







                    Name                Role                Phone

 

                    Jc Albarran MD    Primary Care Provider [phone]









Allergies







             Active Allergy Reactions    Severity     Noted Date   Comments

 

             Morphine                                            







Medications







          Medication Sig       Dispensed Refills   Start     End Date  Status



                                                  Date                

 

          DENAVIR 1 % cream APPLY THREE (3)           0                

    Active



                    TIMES DAILY FOR                     7                   



                    FOUR (4) DAYS.                                         

 

          valACYclovir 1 DAY DOSE AT           0                    Act

violeta



          (VALTREX) 500 MG ONSET OF                      7                   



          tablet    SYMPTOMS: TAKE 4                                         



                    TABLETS                                           



                    IMMEDIATELY                                         



                    TYHEN 4 TABLETS                                         



                    12 HOURS LATER.                                         

 

          rosuvastatin TAKE 1 TABLET 90 tablet 0         20  Act

violeta



          (CRESTOR) 10 MG (10 MG TOTAL) BY                     9         20     

   



          tablet    MOUTH NIGHTLY.                                         



                    FOR CHOLESTEROL                                         

 

          LYSINE ORAL Take by mouth.           0                             Act

violeta

 

          cholecalciferol, Take 1 capsule 12 capsule 3         

0  Discontinued



          vitamin D3, (50,000 Units                     9         20        (Med

 List



          50,000 unit total) by mouth                                         Cl

eanup)



          capsule   once a week.                                         

 

          rosuvastatin Take 1 tablet 90 tablet 0         20  Dis

continued



          (CRESTOR) 10 MG (10 mg total) by                     9         19     

   (Reorder)



          tablet    mouth nightly.                                         



                    For cholesterol                                         







Active Problems







                          Problem                   Noted Date

 

                          Constipation              2018

 

                          History of malignant neoplasm of breast 2018

 

                          Osteoporosis              2018







Encounters







             Date         Type         Specialty    Care Team    Description

 

             2020   Office Visit Internal Medicine Miranda Albarran Medicare 

annual wellness

visit, subsequent (Primary Dx);



                                                    MD Jc   Mixed hyperlipi

demia;



                                                                 Screening for b

reast cancer;



                                                                 Screening for d

iabetes mellitus;



                                                                 Vitamin D defic

iency;



                                                                 Age-related ost

eoporosis without current pathological fracture;



                                                                 Atherosclerosis

;



                                                                 History of melani

gnant neoplasm of breast;



                                                                 Skin lesion

 

             2020   Travel                                 

 

             2020   Travel                                 

 

             2020   Telephone    Internal Medicine Miranda Albarran MD   

 

             2019   Refill       Internal Medicine Miranda Albarran MD   

 

             2019   Hospital Encounter Radiology    Miranda Albarran Mixed hy

perlipidemia



                                                    MD Jc   

 

             2019   Hospital Encounter Radiology    Miranda Albarran Screenin

g for breast



                                                    MD Jc   cancer



after 2019



Immunizations







                    Name                Administration Dates Next Due

 

                    Influenza, Unspecified 2016          

 

                    Pneumococcal Conjugate 13-Valent 2019          

 

                    Pneumococcal Polysaccharide 2014          







Family History







                Medical History Relation        Name            Comments

 

                Cancer          Sister                          









                Relation        Name            Status          Comments

 

                Sister                                          







Social History







             Tobacco Use  Types        Packs/Day    Years Used   Date

 

             Current Every Day Smoker                                        









                Smokeless Tobacco: Never Used                                 









                Alcohol Use     Drinks/Week     oz/Week         Comments

 

                No                                              









                          Sex Assigned at Birth     Date Recorded

 

                          Not on file               









                    Job Start Date      Occupation          Industry

 

                    Not on file         Not on file         Not on file









                    Travel History      Travel Start        Travel End









                                        No recent travel history available.









                    COVID-19 Exposure   Response            Date Recorded

 

                    In the last month, have you been in contact with No / Unsure

         2020  8:41 AM 

CDT



                    someone who was confirmed or suspected to have              

       



                    Coronavirus / COVID-19?                     







Last Filed Vital Signs







                Vital Sign      Reading         Time Taken      Comments

 

                Blood Pressure  118/74          2020  8:58 AM CDT 

 

                Pulse           70              2020  8:58 AM CDT 

 

                Temperature     36.7 C (98 F) 2020  8:58 AM CDT 

 

                Respiratory Rate 18              2020  8:58 AM CDT 

 

                Oxygen Saturation 100%            2020  8:58 AM CDT 

 

                Inhaled Oxygen Concentration -               -               

 

                Weight          58.5 kg (129 lb) 2020  8:58 AM CDT 

 

                Height          170.2 cm (5' 7") 2020  8:58 AM CDT 

 

                Body Mass Index 20.2            2020  8:58 AM CDT 







Plan of Treatment







                Health Maintenance Due Date        Last Done       Comments

 

                SHINGLES VACCINES (#1) 1994                      

 

                INFLUENZA VACCINE 10/01/2020      2016      

 

                COLONOSCOPY SCREENING 2027      

 

                65+ PNEUMOCOCCAL VACCINE Completed       2019, 2014 







Procedures







             Procedure Name Priority     Date/Time    Associated Diagnosis Comme

nts

 

             VITAMIN D 25 HYDROXY Routine      2020 10:05 Medicare annual 

Results for this



             LEVEL                     AM CDT       wellness visit, procedure ar

e in



                                                                subsequent



                                        the results



                                                                Vitamin D defici

ency



                                        section.



                                                    Age-related  



                                                    osteoporosis without 



                                                    current pathological 



                                                    fracture     

 

             VITAMIN B12 LEVEL Routine      2020 10:05 Medicare annual Res

ults for this



                                       AM CDT       wellness visit, procedure ar

e in



                                                    subsequent   the results



                                                                 section.

 

             THYROID STIMULATING Routine      2020 10:05 Medicare annual R

esults for this



             HORMONE                   AM CDT       wellness visit, procedure ar

e in



                                                    subsequent   the results



                                                                 section.

 

             URINALYSIS, COMPLETE, Routine      2020 10:05 Medicare annual

 Results for this



             WITH REFLEX TO              AM CDT       wellness visit, procedure 

are in



             CULTURE                                subsequent   the results



                                                                 section.

 

             LIPID PANEL  Routine      2020 10:05 Medicare annual Results 

for this



                                       AM CDT       wellness visit, procedure ar

e in



                                                                subsequent



                                        the results



                                                    Mixed hyperlipidemia section

.

 

             HEMOGLOBIN A1C Routine      2020 10:05 Medicare annual Result

s for this



                                       AM CDT       wellness visit, procedure ar

e in



                                                                subsequent



                                        the results



                                                    Screening for diabetes secti

on.



                                                    mellitus     

 

             COMPREHENSIVE Routine      2020 10:05 Medicare annual Results

 for this



             METABOLIC PANEL              AM CDT       wellness visit, procedure

 are in



                                                    subsequent   the results



                                                                 section.

 

             CBC WITH PLATELET AND Routine      2020 10:05 Medicare annual

 Results for this



             DIFFERENTIAL              AM CDT       wellness visit, procedure ar

e in



                                                    subsequent   the results



                                                                 section.

 

             URINE CULTURE Routine      2020 10:05              Results fo

r this



                                       AM CDT                    procedure are i

n



                                                                 the results



                                                                 section.

 

             CT CARDIAC CALCIUM Routine      2019 12:40 Mixed hyperlipidem

ia Results for 

this



             SCORE                     PM CDT                    procedure are i

n



                                                                 the results



                                                                 section.

 

             MAMMO BREAST SCREEN Routine      2019 12:05 Screening for ivett

ast Results for 

this



             TOMOSYNTHESIS              PM CDT       cancer       procedure are 

in



             BILATERAL                                           the results



                                                                 section.



after 2019



Results

URINALYSIS, COMPLETE, WITH REFLEX TO CULTURE (2020 10:05 AM CDT)





             Component    Value        Ref Range    Performed At Pathologist



                                                                 Signature

 

             Color, UA    YELLOW       YELLOW       [facility] 



                                                    [place]      

 

             Appearance   CLEAR        CLEAR        [facility] 



                                                    [place]      

 

             Specific gravity, 1.007        1.001 - 1.035 [facility] 



             urine                                  [place]      

 

             pH, urine    5.5          5.0 - 8.0    [facility] 



                                                    [place]      

 

             Glucose, urine NEGATIVE     NEGATIVE     [facility] 



                                                    [place]      

 

             Bilirubin, UA NEGATIVE     NEGATIVE     [facility] 



                                                    [place]      

 

             Ketones, UA  NEGATIVE     NEGATIVE     [facility] 



                                                    [place]      

 

             Occult blood, TRACE (A)    NEGATIVE     [facility] 



             urine                                  [place]      

 

             Protein, UA  NEGATIVE     NEGATIVE     [facility] 



                                                    [place]      

 

             Nitrite, UA  NEGATIVE     NEGATIVE     [facility] 



                                                    [place]      

 

             Leukocyte    TRACE (A)    NEGATIVE     [facility] 



             esterase, UA                           [place]      

 

             WBC, UA      NONE SEEN    < OR = 5 /HPF [facility] 



                                                    [place]      

 

             RBC, UA      NONE SEEN    < OR = 2 /HPF [facility] 



                                                    [place]      

 

             Squamous     NONE SEEN    < OR = 5 /HPF [facility] 



             epithelial cells,                           [place]      



             UA                                                  

 

             Bacteria, UA NONE SEEN    NONE SEEN /HPF [facility] 



                                                    [place]      

 

             Hyaline casts, UA NONE SEEN    NONE SEEN /LPF [facility] 



                                                    [place]      

 

             Reflex       CULTURE                   [facility] 



                          [facility]               [place]      



                          RESULTS TO                             



                          FOLLOW                                 









                                        Specimen

 

                                        









                          Narrative                 Performed At

 

                                        FASTING:YES



                                        [facility]



                          FASTING: YES              









                                        Resulting Agency Comment

 

                                        Performing Organization Information:







                                          Site ID: [id number]







                                          Name: [facility]







                                          Address: [address] [place] [id number]







                                          Director: Gerard Stacy









                Performing Organization Address         City/[facility] Phone

 Number

 

                [facility] [address] [address] [phone]



Vitamin D 25 hydroxy level (2020 10:05 AM CDT)





             Component    Value        Ref Range    Performed At Pathologist



                                                                 Signature

 

             Vitamin D,   24 (L)       30 - 100     [facility] 



             25-hydroxy   Comment:     ng/mL        [place]      



                          Vitamin D Status     25-OH Vitamin D:         

                  



                                                                 



                          Deficiency:          <20 ng/mL    

                       



                          Insufficiency:       20 - 29 ng/mL        

                   



                          Optimal:         > or = 30 ng/mL      

                     



                                                                 



                          For 25-OH Vitamin D testing on patients on            

               



                          D2-supplementation and patients for whom quantitation 

                          



                          of D2 and D3 fractions is required, the [facility]

M)                           



                          25-OH VIT D, (D2,D3), LC/MS/MS is recommended: order  

                         



                          code 76356 (patients >2yrs).                          

 



                          See Note 1                             



                                                                 



                          Note 1                                 



                                                                 



                          For additional information, please refer to           

                



                          http://education.[employer]/faq/ZJV172      

                     



                          (This link is being provided for informational/       

                    



                          educational purposes only.)                           



                                                                 









                                        Specimen

 

                                        Blood









                          Narrative                 Performed At

 

                                        FASTING:YES



                                        [facility]



                          FASTING: YES              









                                        Resulting Agency Comment

 

                                        Performing Organization Information:







                                          Site ID: [id number]







                                          Name: [facility]







                                          Address: [address] [place] [id number]







                                          Director: Gerard Stacy









                Performing Organization Address         City/[facility]/Zipcode Phone

 Number

 

                [facility] [address] [place] [address] [phone]



CBC with platelet and differential (2020 10:05 AM CDT)





             Component    Value        Ref Range    Performed At Pathologist



                                                                 Signature

 

             WBC          7.3          3.8 - 10.8   [facility] 



                                       [place]/uL  [place]      

 

             RBC          4.86         3.80 - 5.10  [facility] 



                                       [place]/uL   [place]      

 

             HGB          14.9         11.7 - 15.5  [facility] 



                                       g/dL         [place]      

 

             HCT          45.4 (H)     35.0 - 45.0 % [facility] 



                                                    [place]      

 

             MCV          93.4         80.0 - 100.0 fL [facility] 



                                                    [place]      

 

             MCH          30.7         27.0 - 33.0 pg [facility] 



                                                    [place]      

 

             MCHC         32.8         32.0 - 36.0  [facility] 



                                       g/dL         [place]      

 

             RDW          12.8         11.0 - 15.0 % [facility] 



                                                    [place]      

 

             Platelet count 219          140 - 400    [facility] 



                                       [place]/uL  [place]      

 

             MPV          10.6         7.5 - 12.5 fL [facility] 



                                                    [place]      

 

             Neutrophils, absolute 4,358        1,500 - 7,800 [facility] 



                                       cells/uL     [place]      

 

             Lymphocytes, absolute 2,380        850 - 3,900  [facility] 



                                       cells/uL     [place]      

 

             Monocytes, absolute 431          200 - 950    [facility] 



                                       cells/uL     [place]      

 

             Eosinophils, absolute 80           15 - 500     [facility] 



                                       cells/uL     [place]      

 

             Basophils, absolute 51           0 - 200      [facility] 



                                       cells/uL     [place]      

 

             Neutrophils  59.7         %            [facility] 



                                                    [place]      

 

             Lymphocytes  32.6         %            [facility] 



                                                    [place]      

 

             Monocytes    5.9          %            [facility] 



                                                    [place]      

 

             Eosinophils  1.1          %            [facility] 



                                                    [place]      

 

             Basophils + RC 0.7          %            [facility] 



                                                    [place]      









                                        Specimen

 

                                        Blood









                          Narrative                 Performed At

 

                                        FASTING:YES



                                        [facility]



                          FASTING: YES              









                                        Resulting Agency Comment

 

                                        Performing Organization Information:







                                          Site ID: [id number]







                                          Name: [facility]

[facility]







                                          Address: [address] [place] [phone]







                                          Director: Gerard Stacy









                Performing Organization Address         City/State/Zipcode Phone

 Number

 

                [employer] [address] [address] [phone]



Urine culture (2020 10:05 AM CDT)





             Component    Value        Ref Range    Performed At Pathologist Sig

nature

 

             Urine culture SEE NOTE                  [facility] 



                          Comment:                  [place]      



                           CULTURE, URINE, ROUTINE                           



                                                               



                           Micro Number:   [id number]                     

      



                           Test Status:    Final                        

   



                           Specimen Source:  URINE                          

 



                           Specimen Quality: Adequate                       

    



                           Result:      No Growth                 

          



                                                                 









                                        Specimen

 

                                        









                          Narrative                 Performed At

 

                                        FASTING:YES



                                        [facility]



                          FASTING: YES              









                                        Resulting Agency Comment

 

                                        Performing Organization Information:







                                          Site ID: [id number]







                                          Name: [facility]







                                          Address: [address] [place] [id number]







                                          Director: Gerard Stacy









                Performing Organization Address         City/[facility] Phone

 Number

 

                [facility] [address] [address] [phone]



Thyroid stimulating hormone (2020 10:05 AM CDT)





             Component    Value        Ref Range    Performed At Pathologist Sig

nature

 

             TSH          2.75         0.40 - 4.50 [facility]IU[facility]L [facility]

ON 









                                        Specimen

 

                                        Blood









                          Narrative                 Performed At

 

                                        FASTING:YES



                                        [facility]



                          FASTING: YES              









                                        Resulting Agency Comment

 

                                        Performing Organization Information:







                                          Site ID: [facility]







                                          Name: [facility]







                                          Address: [address] [place] [id number]







                                          Director: Gerard Stacy









                Performing Organization Address         City/State/[facility] [place] Phone

 Number

 

                [facility] [address] [address] [phone]



Hemoglobin A1c (2020 10:05 AM CDT)





             Component    Value        Ref Range    Performed At Pathologist



                                                                 [facility]

 

             Hemoglobin A1C 5.7 (H)      <5.7 % of    [facility] 



                          Comment:     total Hgb    [facility]IL      



                          For someone without known diabetes, a hemoglobin      

                     



                          A1c value between 5.7% and 6.4% is consistent with    

                       



                          prediabetes and should be confirmed with a            

               



                          follow-up test.                           



                                                                 



                          For someone with known diabetes, a value <7%          

                 



                          indicates that their diabetes is well controlled. A1c 

                          



                          targets should be individualized based on duration of 

                          



                          diabetes, age, comorbid conditions, and other         

                  



                          considerations.                           



                                                                 



                          This assay result is consistent with an increased risk

                           



                          of diabetes.                           



                                                                 



                          Currently, no consensus exists regarding use of       

                    



                          hemoglobin A1c for diagnosis of diabetes for children.

                           



                                                                 



                                                                 









                                        Specimen

 

                                        Blood









                          Narrative                 Performed At

 

                                        FASTING:YES



                                        [facility]



                          FASTING: YES              









                                        Resulting Agency Comment

 

                                        Performing Organization Information:







                                          Site ID: [facility]







                                          Name: [facility]







                                          Address: [address] [place] [id number]







                                          Director: Gerard Stacy









                Performing Organization Address         City/State/[facility] Phone

 Number

 

                [facility] [address] [address] [phone]



Vitamin B12 level (2020 10:05 AM CDT)





             Component    Value        Ref Range    Performed At Pathologist



                                                                 [facility]

 

             Vitamin B12  264          200 - 1,100  [facility] 



                          Comment:     pg/mL        [facility]      



                                                                 



                          Please Note: Although the reference range for vitamin 

                          



                          B12 is 200-1100 pg/mL, it has been reported that betwe

en                           



                          5 and 10% of patients with values between 200 and 400 

                          



                          pg/mL may experience neuropsychiatric and hematologic 

                          



                          abnormalities due to occult B12 deficiency; less than 

1%                           



                          of patients with values above 400 pg/mL will have symp

toms.                           



                                                                 



                                                                 









                                        Specimen

 

                                        Blood









                          Narrative                 Performed At

 

                                        FASTING:YES



                                        [facility]



                          FASTING: YES              









                                        Resulting Agency Comment

 

                                        Performing Organization Information:







                                          Site ID: [facility]







                                          Name: [facility]







                                          Address: [address] [place] [id number]







                                          Director: Gerard Stacy









                Performing Organization Address         City/State/Zipcode Phone

 Number

 

                [facility] [address] [address] [phone]



Lipid panel (2020 10:05 AM CDT)





             Component    Value        Ref Range    Performed At Pathologist



                                                                 [facility]

 

             Cholesterol, total 245 (H)      <200 mg/dL   [facility]      

 

             HDL cholesterol 53           > OR = 50    [facility] 



                                       mg/dL        [place]      

 

             Triglycerides 169 (H)      <150 mg/dL   [facility]      

 

             LDL cholesterol 160 (H)      mg/dL (calc) [facility] 



             calculated   Comment:                  [facility]      



                          Reference range: <100                           



                                                                 



                          Desirable range <100 mg/dL for primary prevention;  

                          



                          <70 mg/dL for patients with CHD or diabetic patients  

                         



                          with > or = 2 CHD risk factors.                       

    



                                                                 



                          LDL-C is now calculated using the Alex-Gillis      

                     



                          calculation, which is a validated novel method providi

ng                           



                          better accuracy than the Friedewald equation in the   

                        



                          estimation of LDL-C.                           



                          Alex FALCON et al. DUANE. 2013;310(19): 0582-2735        

                   



                          (http://education.[employer].pyco/faq/CNI185)    

                       



                                                                 

 

             Cholesterol/HDL 4.6          <5.0 (calc)  [facility] 



             [facility]      

 

             Non-HDL cholesterol 192 (H)      <130 mg/dL   [facility] 



                          Comment:     (calc)       [facility]      



                          For patients with diabetes plus 1 major ASCVD risk    

                       



                          factor, treating to a non-HDL-C goal of <100 mg/dL    

                       



                          (LDL-C of <70 mg/dL) is considered a therapeutic      

                     



                          option.                                



                                                                 









                                        Specimen

 

                                        Blood









                          Narrative                 Performed At

 

                                        FASTING:YES



                                        [facility]



                          FASTING: YES              









                                        Resulting Agency Comment

 

                                        Performing Organization Information:







                                          Site ID: [facility]







                                          Name: [facility]







                                          Address: [address] [place] [id number]







                                          Director: Gerard Stacy









                Performing Organization Address         City/[facility]/Zipcode Phone

 Number

 

                [facility] [address] [place] [address] [phone]



Comprehensive metabolic panel (2020 10:05 AM CDT)





             Component    Value        Ref Range    Performed At Pathologist



                                                                 Signature

 

             Glucose      89           65 - 99      [facility] 



                          Comment:     mg/dL        [facility]      



                                                                 



                                Fasting reference interval            

               



                                                                 



                                                                 

 

             BUN          9            7 - 25 mg/dL [facility] 



                                                    [place]      

 

             Creatinine   0.80         0.60 - 0.93  [facility] 



                          Comment:     mg/dL        [facility]      



                          For patients >49 years of age, the reference limit    

                       



                          for Creatinine is approximately 13% higher for people 

                          



                          identified as -American.                       

    



                                                                 



                                                                 

 

             EGFR Non-Afr. 72           > OR = 60    [facility] 



             American                  mL/min/1.73m [facility]      



                                       2                         

 

             EGFR  83           > OR = 60    [facility] 



             American                  mL/min/1.73m [facility]      



                                       2                         

 

             BUN/creatinine NOT APPLICABLE 6 - 22       [facility] 



             ratio                     (calc)       [facility]      

 

             Sodium       139          135 - 146    [facility] 



                                       mmol/L       [facility]      

 

             Potassium    4.2          3.5 - 5.3    [facility] 



                                       mmol/L       [place]      

 

             Chloride     102          98 - 110     [facility] 



                                       mmol/L       [place]      

 

             CO2          30           20 - 32      [facility] 



                                       mmol/L       [place]      

 

             Calcium      9.3          8.6 - 10.4   [facility] 



                                       mg/dL        [facility]      

 

             Protein      7.2          6.1 - 8.1    [facility] 



                                       g/dL         [place]      

 

             Albumin, S   4.4          3.6 - 5.1    [facility] 



                                       g/dL         [place]      

 

             Globulin, total 2.8          1.9 - 3.7    [facility] 



                                       g/dL (calc)  [facility]      

 

             Albumin/globulin 1.6          1.0 - 2.5    [facility] 



             ratio                     (calc)       [facility]      

 

             Total bilirubin 0.4          0.2 - 1.2    [facility] 



                                       mg/dL        [place]      

 

             Alkaline     69           37 - 153 U/L [facility] 



             phosphatase                            [place]      

 

             AST          16           10 - 35 U/L  [facility] 



                                                    [place]      

 

             ALT          12           6 - 29 U/L   [facility] 



                                                    [place]      









                                        Specimen

 

                                        Blood









                          Narrative                 Performed At

 

                                        FASTING:YES



                                        [facility]



                          FASTING: YES              









                                        Resulting Agency Comment

 

                                        Performing Organization Information:







                                          Site ID: [facility]







                                          Name: [facility]







                                          Address: [address] [place] [address]







                                          Director: Gerard Stacy









                Performing Organization Address         City/[facility]/Zipcode Phone

 Number

 

                [facility] [address] [place] [address] [phone]



Ct cardiac calcium score (2019 12:40 PM CDT)





                                        Specimen

 

                                        









                          Narrative                 Performed At

 

                                        EXAMINATION: CT CARDIAC CALCIUM SCORE



                                         [facility]



                                         



                                        



                          CLINICAL HISTORY: E78.2 Mixed hyperlipidemia, CAD ri

sk intermediate 



                                        asymptomatic, mixed HLD elev apo B -

 please eval coronaries



                                        



                                         



                                        



                                        COMPARISON: Chest CT from 

8.



                                        



                                         



                                        



                                        IMPRESSION:



                                        



                          Sequential 2.5 mm CT cuts were obtained through the 

est using GE 



                          LightSpeed VCT 64 slice CT scanner with ECG gating. In

teractive image 



                          viewing and volumetric display and analysis were also 

performed. The CAC 



                                        score was quantified using the Agatston 



                                        



                                        scoring method.



                                        



                                         



                                        



                                        Agatston total coronary artery calcium s

core: 0



                                        



                                        Left Main (LM): 0



                                        



                                        Left Anterior Descending (LAD): 0



                                        



                                        Left Circumflex (LCx): 0



                                        



                                        Right Coronary Artery (RCA): 0



                                        



                                        Posterior Descending Artery (PDA): 0



                                        



                                         



                                        



                                        Agatston Calcium Score (total) Extent of

 Atherosclerosis



                                        



                                        0-Normal 



                                        



                                        1-10 - Minimal extent of atherosclerosis



                                        



                                         - Mild extent of atherosclerosis



                                        



                                        100-400 - Moderate extent of atheroscler

osis



                                        



                                        > 400 - Severe extent of atherosclerosis



                                        



                                         



                                        



                                        RECOMMENDATION:



                                        



                          A score of 0 places the patient in the 0th percentile 

rank. That means 



                                        100% of the females at the ages from 71-

75 have a higher calcium score. 



                                        



                                         



                                        



                                        Continue primary preventative strategies

 (score zero).



                                        



                                         



                                        



                                        INCIDENTAL FINDINGS:



                                        



                                        1. Aortic calcifications.



                                        



                          2. Stable 3-4 mm solid nodule in the right middle lo

be, 3 mm 



                                        groundglass nodule in the lingula and ar

eas of mucous plugging.



                                        



                                        3. Trace pericardial effusion.



                                        



                                         



                                        



                          [id number]           









                                        Procedure Note

 

                                        Hm Interface, Radiology Results Incoming

 - 2019  1:34 PM CDT



EXAMINATION:  CT CARDIAC CALCIUM SCORE



                                        



                                        CLINICAL HISTORY:  E78.2 Mixed hyperlipi

demia, CAD risk  intermediate  

asymptomatic, mixed HLD  elev apo B - please eval coronaries



                                        



                                        COMPARISON:  Chest CT from 2018

.



                                        



                                        IMPRESSION:



                                        Sequential 2.5 mm CT cuts were obtained 

through the chest using GE LightSpeed 

VCT 64 slice CT scanner with ECG gating. Interactive image viewing and 
volumetric display and analysis were also performed. The CAC score was 
quantified using the Agatston



                                        scoring method.



                                        



                                        Agatston total coronary artery calcium s

core: 0



                                        Left Main (LM): 0



                                        Left Anterior Descending (LAD): 0



                                        Left Circumflex (LCx): 0



                                        Right Coronary Artery (RCA): 0



                                        Posterior Descending Artery (PDA): 0



                                        



                                        Agatston Calcium Score (total) Extent of

 Atherosclerosis



                                        0-Normal



                                        1-10 - Minimal extent of atherosclerosis



                                         - Mild extent of atherosclerosis



                                        100-400 - Moderate extent of atheroscler

osis



                                        > 400 - Severe extent of atherosclerosis



                                        



                                        RECOMMENDATION:



                                        A score of 0 places the patient in the 0

th percentile rank. That means 100% of 

the females at the ages from 71-75 have a higher calcium score.



                                        



                                        Continue primary preventative strategies

 (score zero).



                                        



                                        INCIDENTAL FINDINGS:



                                        1.  Aortic calcifications.



                                        2.  Stable 3-4 mm solid nodule in the ri

ght middle lobe, 3 mm groundglass nodule

in the lingula and areas of mucous plugging.



                                        3.  Trace pericardial effusion.



                                        



                                        [id number]









                Performing Organization Address         City/[facility]/Zipcode Phone

 Number

 

                [employer]      [address] [place] [address] 



Mammo Breast Screen Tomosynthesis Bilateral (2019 12:05 PM CDT)





                                        Specimen

 

                                        









                          Narrative                 Performed At

 

                                        PROCEDURE: MAMMO BREAST SCREEN TOMOSYNTH

ESIS BILATERAL



                                         [facility]



                                         



                                        



                          Computer aided detection was utilized for the interpre

tation of the 



                                        digital bilateral screening mammography 

with tomosynthesis.



                                        



                                         



                                        



                                        COMPARISON: None available.



                                        



                                         



                                        



                          INDICATION: 74-year-old female with a history of right

 breast malignancy 



                          with lumpectomy and radiation in . She presents fo

r routine 



                                        screening.



                                        



                                         



                                        



                          FINDINGS: The breast are heterogenously dense, which m

ay obscure small 



                                        masses.



                                        



                          There is a postsurgical scar with associated dystrophi

c calcifications 



                          in the right superior posterior depth. No significant 

mass, asymmetry or 



                                        architectural distortion is identified i

n either breast.



                                        



                                         



                                        



                                        IMPRESSION: No mammographic evidence o

f malignancy.



                                        



                                         



                                        



                                        RECOMMENDATION: Annual screening mammogr

am.



                                        



                                         



                                        



                                        BI-RADS 2: BENIGN



                                        



                                         



                                        



                                         



                                        



                          This facility is accredited by The [employer] College of

 Radiology for 



                                        Mammography. 



                                        



                                         



                                        



                          A negative x-ray report should not delay biopsy if a d

ominant or 



                          clinically suspicious mass is present. Not all cancers

 are identified by 



                                        x-ray. 



                                        



                                         



                                        



                                        [id number]



                                        



                                                    









                Performing Organization Address         City/State/Zipcode Phone

 Number

 

                [employer]      [id number] [place] [address] 



after 2019



Insurance







          Payer     Benefit Plan / Subscriber ID Effective Dates Phone     Addre

ss   Type



                    Group                                             

 

          HU[employer]NA MEDICARE HU[employer]NA MEDICARE xxxxxxxxx 2018-Present            

         PPO



                    PPO/PFFS/ERS [facility]                                         









           Guarantor Name Account Type Relation to Date of    Phone      Billing

 Address



                                 Patient    Birth                 

 

           Ermelinda Malhotra Personal/Famil Self       1945 [phone] [address]                                (Home)     [place], [place]



                                                                  [address]







Advance Directives

For more information, please contact: [phone]





                Type            Date Recorded   Patient Representative Explanati

on

 

                Advance Directives, Living Will and                             

    



                Medical Power of

## 2020-08-27 NOTE — ER
Nurse's Notes                                                                                     

 [facility]                                                                 

Name: Ermelinda Malhotra                                                                               

Age: 75 yrs                                                                                       

Sex: Female                                                                                       

: 1945                                                                                   

MRN: [id number]                                                                                   

Arrival Date: 2020                                                                          

Time: 16:38                                                                                       

Account#: [id number]                                                                            

Bed 15                                                                                            

Private MD:                                                                                       

Diagnosis: Dizziness and giddiness;Dehydration                                                    

                                                                                                  

Presentation:                                                                                     

                                                                                             

16:46 Chief complaint: Patient states: dizziness and nausea x 1 day. Coronavirus screen:      sv  

      Client denies travel out of the [place] in the last 14 days. At this time, the client does     

      not indicate any symptoms associated with coronavirus-19. Ebola Screen: No symptoms or      

      risks identified at this time. Risk Assessment: Do you want to hurt yourself or someone     

      else? Patient reports no desire to harm self or others. Onset of symptoms was 2020.                                                                                   

16:46 Method Of Arrival: Ambulatory                                                           sv  

16:46 Acuity: ADENIKE 3                                                                           sv  

16:47 Initial Sepsis Screen: Does the patient meet any 2 criteria? No. Patient's initial      sv  

      sepsis screen is negative. Does the patient have a suspected source of infection? No.       

      Patient's initial sepsis screen is negative.                                                

                                                                                                  

Historical:                                                                                       

- Allergies:                                                                                      

16:47 Morphine;                                                                               sv  

- PMHx:                                                                                           

16:47 None;                                                                                   sv  

- PSHx:                                                                                           

16:47 Hysterectomy; Hemorroidectomy; Appendectomy;                                            sv  

                                                                                                  

- Immunization history:: Adult Immunizations up to date.                                          

- Social history:: Smoking status: .                                                              

                                                                                                  

                                                                                                  

Screenin:51 Abuse screen: Denies threats or abuse. Nutritional screening: No deficits noted.        tw2 

      Tuberculosis screening: No symptoms or risk factors identified. Fall Risk None              

      identified.                                                                                 

                                                                                                  

Assessment:                                                                                       

17:00 General: Appears uncomfortable, slender, Behavior is calm, cooperative, appropriate for tw2 

      age. Pain: Denies pain. Neuro: Level of Consciousness is awake, alert, obeys commands,      

      Oriented to person, place, time, situation. Cardiovascular: Heart tones S1 S2 Patient's     

      skin is warm and dry. Respiratory: Airway is patent Respiratory effort is even,             

      unlabored, Respiratory pattern is regular, symmetrical, Breath sounds are clear             

      bilaterally. GI: Abdomen is flat, Bowel sounds present X 4 quads. Reports intolerance       

      of fluids, intolerance of food, nausea, vomiting, since last night. GI: Pt is actively      

      vomiting clear fluid, and dry heaving at this time, provider notified. : No signs         

      and/or symptoms were reported regarding the genitourinary system. EENT: No signs and/or     

      symptoms were reported regarding the EENT system. Derm: No signs and/or symptoms            

      reported regarding the dermatologic system. Musculoskeletal: Range of motion: intact in     

      all extremities.                                                                            

18:00 Reassessment: pt back from CT, states she vomited in CT and is not feeling better,      tw2 

      provider notified and medicated as ordered.                                                 

18:18 Reassessment: pts sister Megan ph# [phone].                                       tw2 

19:05 Reassessment: Patient appears in no apparent distress at this time. Patient and/or      jb4 

      family updated on plan of care and expected duration. Pain level reassessed. Patient is     

      alert, oriented x 3, equal unlabored respirations, skin warm/dry/pink. PT assisted back     

      to bed, pt has steady gait and gave urine sample.                                           

20:00 Reassessment: Patient appears in no apparent distress at this time. Patient and/or      jb4 

      family updated on plan of care and expected duration. Pain level reassessed. Patient is     

      alert, oriented x 3, equal unlabored respirations, skin warm/dry/pink. Updated pt and       

      family on plan of care.                                                                     

21:00 Reassessment: Patient and/or family updated on plan of care and expected duration. Pain jb4 

      level reassessed. Patient is alert, oriented x 3, equal unlabored respirations, skin        

      warm/dry/pink. PT reports nausea has decreased but remains dizzy when changing              

      positions. Family updated on plan of care.                                                  

22:00 Reassessment: No changes from previously documented assessment. Patient and/or family   jb4 

      updated on plan of care and expected duration. Pain level reassessed. Patient is alert,     

      oriented x 3, equal unlabored respirations, skin warm/dry/pink.                             

23:25 Reassessment: Patient appears in no apparent distress at this time. Patient and/or      jb4 

      family updated on plan of care and expected duration. Pain level reassessed. Patient is     

      alert, oriented x 3, equal unlabored respirations, skin warm/dry/pink. Pt resting in        

      bed, updated on plan of care.                                                               

                                                                                                  

Vital Signs:                                                                                      

16:47  / 57; Pulse 76; Resp 16; Temp 97.8; Pulse Ox 100% ; Weight 58.97 kg; Height 5    sv  

      ft. 7 in. (170.18 cm);                                                                      

18:18  / 63; Pulse 55; Resp 17; Pulse Ox 100% on R/A;                                   tw2 

19:30  / 073; Pulse 61; Resp 16; Pulse Ox 100% on R/A;                                  jb4 

20:30  / 55; Pulse 60; Resp 16; Pulse Ox 100% on R/A;                                   jb4 

21:30  / 49; Pulse 73; Resp 16; Pulse Ox 100% on R/A;                                   jb4 

22:30 BP 99 / 45; Pulse 62; Resp 16; Pulse Ox 98% on R/A;                                     jb4 

23:15  / 57; Pulse 52; Resp 15; Pulse Ox 98% on R/A;                                    jb4 

16:47 Body Mass Index 20.36 (58.97 kg, 170.18 cm)                                             sv  

                                                                                                  

ED Course:                                                                                        

16:38 Patient arrived in ED.                                                                  ds1 

16:45 Arm band placed on.                                                                     sv  

16:46 Triage completed.                                                                       sv  

16:51 Sharifa Alva RN is Primary Nurse.                                                        tw2 

16:52 Placed in gown. Bed in low position. Pulse ox on. NIBP on.                              tw2 

17:04 Inserted saline lock: 22 gauge in right antecubital area, using aseptic technique.      tw2 

      Blood collected.                                                                            

17:06 Arlene Jay FNP-C is PHCP.                                                          snw 

17:06 Prasanna Liao MD is Attending Physician.                                                snw 

17:40 CT Head Brain wo Cont In Process Unspecified.                                           EDMS

19:00 Report given to Yefri.                                                               tw2 

19:05 Primary Nurse role handed off by Sharifa Alva RN                                         jb4 

19:05 Tj Heard RN is Primary Nurse.                                                     jb4 

19:16 Urine Microscopic Only Sent.                                                            tw2 

19:16 Urine Culture Sent.                                                                     tw2 

21:28 Jose Eduardo Matson PA is Hospitalizing Provider.                                        snw 

21:29 Hospitalizing Provider role handed off by Jose Eduardo Matson PA                         snw 

21:29 Jesse Liao MD is Hospitalizing Provider.                                           snw 

23:34 No provider procedures requiring assistance completed. Patient admitted, IV remains in  jb4 

      place.                                                                                      

                                                                                                  

Administered Medications:                                                                         

17:30 Drug: Antivert 50 mg Route: PO;                                                         tw2 

18:00 Follow up: Response: No adverse reaction                                                tw2 

17:30 Drug: Phenergan 25 mg Route: PO;                                                        tw2 

19:00 Follow up: Response: No adverse reaction; No change in condition                        tw2 

18:05 Drug: NS 0.9% 1000 ml Route: IV; Rate: 1 bolus; Site: right antecubital;                tw2 

19:20 Follow up: Response: No adverse reaction; IV Status: Completed infusion; IV Intake:     jb4 

      1000ml                                                                                      

18:05 Drug: Valium 2 mg Route: IVP; Site: right antecubital;                                  tw2 

19:00 Follow up: Response: No adverse reaction; No adverse reaction, feeling "some better",   tw2 

      still c/o dizziness                                                                         

19:50 Drug: NS 0.9% 1000 ml Route: IV; Rate: 1 bolus; Site: right antecubital;                jb4 

20:50 Follow up: Response: No adverse reaction; IV Status: Completed infusion; IV Intake:     jb4 

      1000ml                                                                                      

19:50 Drug: Antivert 25 mg Route: PO;                                                         jb4 

21:00 Follow up: Response: No adverse reaction; No change in condition                        jb4 

19:51 Drug: Phenergan 6.25 mg Route: IVP; Site: right antecubital;                            jb4 

20:30 Follow up: Response: No adverse reaction; Nausea is decreased; Vomiting decreased       jb4 

21:00 Drug: Aspirin 81 mg Route: PO;                                                          jb4 

21:30 Follow up: Response: No adverse reaction                                                jb4 

21:00 Drug: foLIC Acid 1 mg Route: IVPB; Site: right antecubital;                             jb4 

21:05 Follow up: Response: No adverse reaction; IV Status: Completed infusion                 jb4 

                                                                                                  

                                                                                                  

Intake:                                                                                           

19:20 IV: 1000ml; Total: 1000ml.                                                              jb4 

20:50 IV: 1000ml; Total: 2000ml.                                                              jb4 

                                                                                                  

Outcome:                                                                                          

21:29 Decision to Hospitalize by Provider.                                                    snw 

23:34 Admitted to Med/surg accompanied by tech, via wheelchair, room 211, with chart, Report  jb4 

      called to  HELLEN Lepe                                                                        

23:34 Condition: stable                                                                           

23:34 Discharge instructions given to patient, Instructed on the need for admit, Demonstrated     

      understanding of instructions.                                                              

                                                                                             

00:25 Patient left the ED.                                                                    iw  

                                                                                                  

Addendum:                                                                                         

2020                                                                                        

     11:02 Addendum: COVID-19 Result: Negative result given to RN to notify pt. Attempted to       i
w

           contact pt regarding negative COVID-19 swab results. Left voice mail.                  

     16:19 Addendum: COVID-19 Result: Negative result given to RN to notify pt. Notified pt of     i
w

           negative COVID 19 swab results. Pt advised that even with a negative test result they  

           should remain in isolation until symptom free for 3 days without medication. Pt also   

           advised to return to the ED for worsening symptoms.                                    

                                                                                                  

Signatures:                                                                                       

Dispatcher [employer]                           Michelle Gomes RN RN sv Waters, Shelly, FNP-C                   FNP-Csnw                                                  

Lelo Perez                                ds1                                                  

Letha Navarrete RN RN   iw                                                   

Sharifa Alva RN RN   tw2                                                  

Tj Heard RN                       RN   jb4                                                  

                                                                                                  

Corrections: (The following items were deleted from the chart)                                    

                                                                                             

23:45 21:00 Reassessment: Patient and/or family updated on plan of care and expected          jb4 

      duration. Pain level reassessed. Patient is alert, oriented x 3, equal unlabored            

      respirations, skin warm/dry/pink. PT reports nausea has decreased but remains dizzy         

      when changing positions. jb4                                                                

                                                                                                  

**************************************************************************************************

## 2020-08-28 VITALS — SYSTOLIC BLOOD PRESSURE: 106 MMHG | TEMPERATURE: 97.2 F | DIASTOLIC BLOOD PRESSURE: 54 MMHG

## 2020-08-28 VITALS — OXYGEN SATURATION: 99 %

## 2020-08-28 LAB
CKMB CREATINE KINASE MB: < 1 NG/ML (ref 0.3–3.6)
HDLC SERPL-MCNC: 49 MG/DL (ref 40–60)
LDLC SERPL CALC-MCNC: 131 MG/DL (ref ?–130)
TROPONIN I: < 0.02 NG/ML (ref 0–0.04)

## 2020-08-28 RX ADMIN — SODIUM CHLORIDE SCH MLS: 0.9 INJECTION, SOLUTION INTRAVENOUS at 01:16

## 2020-08-28 RX ADMIN — SODIUM CHLORIDE SCH: 0.9 INJECTION, SOLUTION INTRAVENOUS at 14:20

## 2020-08-28 NOTE — P.DS
Admission Date: 08/27/20


Discharge Date: 08/29/20


Disposition: ROUTINE DISCHARGE


Discharge Condition: GOOD


Reason for Admission: CVA?/dizziness


Consultations: 





neurology - Dr. Jaramillo


Procedures: 





CT - Head Brain Wo Cont - 8/27/2020 5:40 pm 


IMPRESSION:  No acute intracranial abnormality.





US - CP - 8/28/2020 8:11 am


IMPRESSION:  Calcified plaquing changes are present in the left carotid bulb 

without a significant narrowing of the vessel lumen.


No evidence of a hemodynamically significant stenosis.





MRI - MRA Neck W/Wo Cont - 8/28/2020 11:05 am


IMPRESSION:  Contrast enhanced MRA neck imaging shows no significant or 

suspicious finding.





MRI - MRA Head Wo Cont - 8/28/2020 11:06 am


IMPRESSION:  Mild intracranial atherosclerotic changes with no acute finding.








Problem List:


CVA versus dizziness


Nausea and vomiting











Brief History of Present Illness: 








75-year-old female with no significant past medical history presents to the 

emergency room with complaints of nausea and dizziness.  States that it started 

suddenly this morning.  It is aggravated by movement and relieved by staying 

still.  Patient also complaining of nausea and has had 2 or 3 episodes of 

vomiting.  Patient vomited during CT imaging of the head.  





In the ER blood work is fairly unremarkable.  Patient was treated with IV fluids

and medications in the ED.  She was improving but continued with worsening 

symptoms again.  Neurology was consulted and Dr. Jaramillo will follow up with 

patient in the morning.  Patient may require MRI to rule out CVA.  Patient will 

be placed in observation and further evaluated. 











Hospital Course: 





Patient underwent above workup and was admitted to the hospital. Workup was 

negative for acute abnormalities. It was felt patient was experiencing symptoms 

of BPPV. She was instructed on Epley maneuvers and given a prescription for 

meclizine. She had resolution of her symptoms and felt back to her baseline on 

day of discharge.


Vital Signs/Physical Exam: 














Temp Pulse Resp BP Pulse Ox


 


 97.2 F   56   16   106/54 L  98 


 


 08/28/20 12:00  08/28/20 12:00  08/28/20 12:00  08/28/20 12:00 08/28/20 12:00








General: Alert, In no apparent distress, Oriented x3


HEENT: PERRLA, Mucous membr. moist/pink, EOMI, Sclerae nonicteric


Neck: No LAD


Respiratory: Clear to auscultation bilaterally, Normal air movement


Cardiovascular: No edema, Regular rate/rhythm, Normal S1 S2


Gastrointestinal: Normal bowel sounds, Soft and benign, No tenderness


Musculoskeletal: No erythema, No tenderness


Integumentary: No rashes, No erythema


Neurological: Normal speech, Cranial nerves 3-12 intact, Normal affect


Laboratory Data at Discharge: 














WBC  9.5 K/uL (4.3-10.9)   08/27/20  17:04    


 


Hgb  15.6 g/dL (12.0-15.0)  H  08/27/20  17:04    


 


Hct  47.2 % (36.0-45.0)  H  08/27/20  17:04    


 


Plt Count  227 K/uL (152-406)   08/27/20  17:04    


 


Sodium  141 mmol/L (136-145)   08/27/20  17:04    


 


Potassium  4.3 mmol/L (3.5-5.1)   08/27/20  17:04    


 


BUN  12 mg/dL (7-18)   08/27/20  17:04    


 


Creatinine  0.85 mg/dL (0.55-1.3)   08/27/20  17:04    


 


Glucose  115 mg/dL ()  H  08/27/20  17:04    


 


Total Bilirubin  0.5 mg/dL (0.2-1.0)   08/27/20  17:04    


 


AST  16 U/L (15-37)   08/27/20  17:04    


 


ALT  19 U/L (12-78)   08/27/20  17:04    


 


Alkaline Phosphatase  79 U/L ()   08/27/20  17:04    


 


Troponin I  Cancelled   08/28/20  16:15    


 


Triglycerides  82 mg/dL (<150)   08/28/20  03:45    


 


Cholesterol  196 mg/dL (<200)   08/28/20  03:45    


 


HDL Cholesterol  49 mg/dL (40-60)   08/28/20  03:45    


 


Cholesterol/HDL Ratio  4.00   08/28/20  03:45    


 


Lipase  127 U/L ()   08/27/20  17:04    








Home Medications: 








NK [No Home Meds]  08/28/20 





Patient Discharge Instructions: Follow up with your PCP within 1 week


Diet: Regular


Activity: Ad zain


Followup: 


Logan Jaramillo MD [ASSOCIATE-ACTIVE - CAN ADMIT] - 


Time spent managing pt's care (in minutes): 45

## 2020-08-28 NOTE — RAD REPORT
EXAM DESCRIPTION:  MRI - Brain W/Wo Cont - 8/28/2020 11:05 am

 

CLINICAL HISTORY:  dizziness, stroke-like symptoms

 

COMPARISON:  MRA Head Wo Cont dated 8/28/2020; MRA Neck W/Wo Cont dated 8/28/2020; Head Brain Wo Cont
 dated 8/27/2020

 

TECHNIQUE:  Sagittal and axial T1-weighted images were obtained. Axial PD/heavily T2-weighted and T2-
FLAIR images were obtained along with axial DWI/ADC mapping sequences.  Coronal heavily T2 weighted s
equence obtained.  Axial and coronal post-contrast T1-weighted images were also obtained. A 13  ml Mu
ltihance contrast following utilized.

 

FINDINGS:  No intracranial hemorrhage, mass or acute infarction.  There is no edema or shift of midli
ne structures. No extra-axial fluid collections. Gray-matter/white matter junction is preserved.  Sig
nal voids are seen as a normal finding in the major intracranial vessels. Patient has a moderate-size
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 No associated cavernoma identified. No acute or chronic hem
orrhagic component identifiable. No other vascular malformations identified. Ventricles are normal.

 

Post-contrast images show no abnormal brain parenchyma or dural enhancement. There is normal enhancem
ent of the venous angioma.

 

Mastoid air cells and paranasal sinuses are clear.

 

 

IMPRESSION:  No infarction or acute intracranial finding identified.

 

Patient has moderate-sized venous angioma within the right cerebellum. No cavernoma identifiable at t
his site. This is believed to be incidental to the current clinical presentation.

## 2020-08-28 NOTE — P.HP
Certification for Inpatient


Patient admitted to: Observation


With expected LOS: <2 Midnights


Patient will require the following post-hospital care: None


Practitioner: I am a practitioner with admitting privileges, knowledge of 

patient current condition, hospital course, and medical plan of care.


Services: Services provided to patient in accordance with Admission requirements

found in Title 42 Section 412.3 of the Code of Federal Regulations





<Jose Eduardo Matson - Last Filed: 08/28/20 00:15>





Patient History


Date of Service: 08/28/20


Reason for admission: CVA?/dizziness


History of Present Illness: 





75-year-old female with no significant past medical history presents to the 

emergency room with complaints of nausea and dizziness.  States that it started 

suddenly this morning.  It is aggravated by movement and relieved by staying 

still.  Patient also complaining of nausea and has had 2 or 3 episodes of 

vomiting.  Patient vomited during CT imaging of the head.  





In the ER blood work is fairly unremarkable.  Patient was treated with IV fluids

and medications in the ED.  She was improving but continued with worsening sympt

oms again.  Neurology was consulted and Dr. Jaramillo will follow up with patient

in the morning.  Patient may require MRI to rule out CVA.  Patient will be 

placed in observation and further evaluated. 


Home medications list reviewed: No





- Past Medical/Surgical History


Diabetic: No


Past Medical History: Reviewed- Non-Contributory


-: None


-: Hysterectomy


-: Appendectomy


-: Hemorrhoidectomy





- Family History


Family History: Reviewed- Non-Contributory





- Social History


Smoking Status: Never smoker


Smoking therapy provided: No


Patient receptive to therapy: No


Alcohol use: No


CD- Drugs: No


Caffeine use: No


Place of Residence: Home





<Jose Eduardo Matson - Last Filed: 08/28/20 00:15>


Date of Service: 08/29/20





<Jesse Liao - Last Filed: 08/29/20 20:20>


Allergies





morphine Adverse Reaction (Verified 08/28/20 00:30)


   Nausea/Vomiting








Review of Systems


General: As per HPI


Eyes: Unremarkable


ENT: Unremarkable


Respiratory: Unremarkable


Cardiovascular: Unremarkable


Gastrointestinal: Nausea, Vomiting, As per HPI


Genitourinary: Unremarkable


Musculoskeletal: Unremarkable


Integumentary: Unremarkable


Neurological: Incoordination, As per HPI


Lymphatics: Unremarkable





<Jose Eduardo Matson - Last Filed: 08/28/20 00:15>





Physical Examination





- Vital Signs


Temperature: 97.8 F


Blood Pressure: 109/49


Pulse: 73


Respirations: 16


Pulse Ox (%): 100 (RA)





- Physical Exam


General: Alert, In no apparent distress, Oriented x3


HEENT: Atraumatic, Normocephalic, PERRLA, Mucous membr. moist/pink


Neck: Supple, Other (Trachea midline)


Respiratory: Clear to auscultation bilaterally, Normal air movement


Cardiovascular: No edema, Normal pulses, Regular rate/rhythm, Normal S1 S2


Capillary refill: <2 Seconds


Gastrointestinal: Normal bowel sounds, Soft and benign, Non-distended


Musculoskeletal: No clubbing, No swelling, No contractures, No erythema, No 

tenderness


Integumentary: No rashes, No breakdown, No significant lesion, No 

tenderness/swelling


Neurological: Normal speech, Normal strength at 5/5 x4 extr, Normal tone, 

Abnormal gait





- Studies


Laboratory Data (last 24 hrs)





08/27/20 17:04: WBC 9.5, Hgb 15.6 H, Hct 47.2 H, Plt Count 227


08/27/20 17:04: Sodium 141, Potassium 4.3, BUN 12, Creatinine 0.85, Glucose 115 

H, Total Bilirubin 0.5, AST 16, ALT 19, Alkaline Phosphatase 79, Lipase 127








<Jose Eduardo Matson - Last Filed: 08/28/20 00:15>





Assessment and Plan





- Plan





Impression:


CVA versus dizziness:


Nausea and vomiting:





Plan:


CVA versus dizziness:  CT of the head was negative for acute pathology.  Will 

order MRI of the brain for the morning.  Neurology consulted Dr. Jaramillo will 

evaluate patient in the morning.


Nausea and vomiting:  Continue IV anti emetics and IV fluids.  Monitor.





Discharge Plan: Home


Plan to discharge in: 48 Hours





- Advance Directives


Does patient have a Living Will: No


Does patient have a Durable POA for Healthcare: No





- Code Status/Comfort Care


Code Status Assessed: Yes


Time Spent Managing Pts Care (In Minutes): 55





<Jose Eduardo Matson - Last Filed: 08/28/20 00:15>


Physician Review Additional Text: 





Plan of care reviewed with Jose Eduardo Matson, and I agree with plan as noted 

above.





<Jesse Liao - Last Filed: 08/29/20 20:20>

## 2020-08-28 NOTE — RAD REPORT
EXAM DESCRIPTION:   - CP - 8/28/2020 8:11 am

 

CLINICAL HISTORY:  bradycardia, dizziness

 

COMPARISON:  No comparisons

 

TECHNIQUE:  Real-time sonographic evaluation of bilateral carotid and vertebral systems was performed
. Gray scale and Doppler interrogation were performed with waveform tracing bilaterally.

 

FINDINGS:  Normal high resistance waveforms are noted in both external carotid arteries. The common c
arotid arteries and internal carotid arteries show normal low resistance waveforms.

 

Mild calcified plaquing changes are present in the left carotid bulb. Left ICA is tortuous. No other 
significant plaquing changes. No dissection identified. Peak systolic and end diastolic velocity valu
es and the ICA/CCA ratios are in the non-hemodynamically significant range.

 

Antegrade flow seen in both vertebral arteries.

 

Velocity values and ratios were recorded and are retained in the patient's imaging records.

 

 

IMPRESSION:  Calcified plaquing changes are present in the left carotid bulb without a significant na
rrowing of the vessel lumen.

 

No evidence of a hemodynamically significant stenosis.

## 2020-08-28 NOTE — CON
Reason For Consultation:  Consultation called because of positional vertigo.



History Of Present Illness:  Ms. Malhotra is a 75-year-old right-handed  patient with a long 
history of vertigo she says since childhood.  She comes in after an episode lasting approximately 2 d
ays.  She recalls as a young child if she ever stood on her head and then got back on her feet, she w
ould have vertigo lasting several minutes.  Typically, the events occur on changing position of her h
ead, either turning right to left or going from a sitting to standing position.  She feels like the w
orld is moving back and forth.  She begins very sick to the stomach and actually vomits.  Episodes la
st from several minutes up to 2 days.  Her most recent event that precipitated admission began on Tue
sday.  She reports receiving some IV fluids, which improved her symptoms and it resolved within about
 2 days.  Her head CT scan showed no acute ischemic or hemorrhagic change.  Brain MRI was remarkable 
for a right cerebellum venous angioma that is moderate size, not felt to be a contributing factor to 
her symptoms.  She had no ischemic or hemorrhagic abnormalities.  The neck MRA showed no significant 
abnormalities and the brain MRA showed no significant arthrosclerotic changes.  The blood work was re
markable for mild perhaps dehydration with slightly elevated glucose, elevated hemoglobin, hematocrit
, and red blood cell count along with neutrophils being slightly elevated at 74.6 with normal white b
lood cell count of 9.5.  Urinalysis showed 4+ ketones, blood 2+, red blood cells were 5-10.



Past Medical History:  As indicated, long history of vertigo.



Past Surgical History:  Hysterectomy, appendectomy, hemorrhoidectomy.



Family History:  Noncontributory.



Social History:  No alcohol, tobacco, or IV drug use.



Allergies:  MORPHINE CAUSES NAUSEA AND VOMITING.



Review of Systems:

Aside from mentioned above with her nausea, vomiting, and certain head positions, she has no recent f
liliane, chills, myalgias, arthralgias, rash, headache, weight change, psychiatric issues, gastrointest
inal or genitourinary issues aside from the nausea and vomiting with her vertigo.



Physical Examination:

Vital Signs:  Blood pressure 99 ranging to 125/52 to 58, pulse ranged from 50 to 56, temperature 97.4
, oxygen saturation 98% to 99% on room air, and respiratory rate 16.  Weight 131 pounds and height 5.
7, BMI 20.5. 

General:  Ms. Malhotra is resting comfortably in bed.  She is in no acute distress. 

HEENT:  She is normocephalic, atraumatic.  Sclerae anicteric.  Oropharynx is pink and moist. 

Neck:  Supple. 

Chest:  Clear. 

Heart:  Regular. 

Extremities:  No clubbing, cyanosis, or edema. 

Neurologic:  She is alert and oriented to person, place, time, and situation.  She follows all comman
ds appropriately.  She has no focal cranial nerve, motor, coordination, sensory or gait deficits.



Assessment:  Ms. Malhotra is a 75-year-old patient with benign paroxysmal positional vertigo.  She was
 shown how to perform the Epley maneuver and given meclizine 25 mg every 8 hours as needed.



Plan:  She will be discharged home and perform the Epley maneuver as required and do use meclizine as
 needed.  She may follow up with Dr. Jaramillo in clinic 1 month after discharge.





JENNYFER/CAROL

DD:  08/28/2020 18:01:19Voice ID:  [id number]

DT:  08/28/2020 21:14:59Report ID:  [id number]

## 2020-08-28 NOTE — EKG
Test Date:    2020-08-27               Test Time:    20:59:45

Technician:   NATALY                                    

                                                     

MEASUREMENT RESULTS:                                       

Intervals:                                           

Rate:         59                                     

SD:           154                                    

QRSD:         78                                     

QT:           438                                    

QTc:          433                                    

Axis:                                                

P:            81                                     

SD:           154                                    

QRS:          81                                     

T:            86                                     

                                                     

INTERPRETIVE STATEMENTS:                                       

                                                     

Sinus bradycardia

Otherwise normal ECG

No previous ECG available for comparison



Electronically Signed On 08-28-20 07:53:09 CDT by Rahul Coyle

## 2020-08-28 NOTE — RAD REPORT
EXAM DESCRIPTION:  MRI - MRA Head Wo Cont - 8/28/2020 11:06 am

 

CLINICAL HISTORY:  Dizziness, stroke-like symptoms

 

COMPARISON:  CT head August 27, MRI brain August 28

 

TECHNIQUE:  Axial and coronal 3D time-of-flight image acquisition was performed. 3D rotational images
 were generated with source and reconstruction images reviewed. Horizontal and vertical axis rotation
al views generated using MIP protocol.

 

FINDINGS:  No aneurysm or arterial vascular malformation seen. Patient has a known right cerebellum v
enous angioma.

 

Mild atherosclerotic changes are seen in the bilateral middle cerebral and anterior cerebral artery d
istributions. No basilar or internal carotid artery stenosis identifiable. No dissection is present.

 

IMPRESSION:  Mild intracranial atherosclerotic changes with no acute finding.

## 2020-08-28 NOTE — RAD REPORT
EXAM DESCRIPTION:  MRI - MRA Neck W/Wo Cont - 8/28/2020 11:05 am

 

CLINICAL HISTORY:  Dizziness, stroke-like symptoms

 

COMPARISON:  MRI head same date, MRA neck same date, carotid ultrasound same date

 

TECHNIQUE:  MR angiography of the cervical vasculature performed. Coronal imaging plane acquisition u
tilized. A 13 MultiHance contrast volume was utilized. Coronal reformatted images were generated and 
reviewed. Vertical axis 3D rotational projections obtained using maximum intensity projection protoco
l.

 

FINDINGS:  Aortic arch is bovine configuration. No origin stenoses. Left vertebral artery arises from
 the aortic arch is a normal variant. Vertebral arteries are codominant. No dissection of the carotid
 or vertebral vasculature seen. Left internal carotid artery is mildly tortuous. No significant ather
osclerotic change or luminal narrowing. The plaquing seen on the ultrasound does not generate any lum
inal narrowing on MRA neck imaging.

 

IMPRESSION:  Contrast enhanced MRA neck imaging shows no significant or suspicious finding.